# Patient Record
Sex: FEMALE | Race: WHITE | URBAN - METROPOLITAN AREA
[De-identification: names, ages, dates, MRNs, and addresses within clinical notes are randomized per-mention and may not be internally consistent; named-entity substitution may affect disease eponyms.]

---

## 2017-07-22 ENCOUNTER — INPATIENT (INPATIENT)
Facility: HOSPITAL | Age: 22
LOS: 4 days | Discharge: HOME | End: 2017-07-27
Attending: PSYCHIATRY & NEUROLOGY | Admitting: FAMILY MEDICINE
Payer: MEDICAID

## 2017-07-22 VITALS
TEMPERATURE: 98 F | WEIGHT: 179.9 LBS | DIASTOLIC BLOOD PRESSURE: 73 MMHG | RESPIRATION RATE: 16 BRPM | SYSTOLIC BLOOD PRESSURE: 151 MMHG | HEART RATE: 96 BPM | OXYGEN SATURATION: 96 %

## 2017-07-22 DIAGNOSIS — F39 UNSPECIFIED MOOD [AFFECTIVE] DISORDER: ICD-10-CM

## 2017-07-22 DIAGNOSIS — T14.91 SUICIDE ATTEMPT: ICD-10-CM

## 2017-07-22 DIAGNOSIS — F19.929 OTHER PSYCHOACTIVE SUBSTANCE USE, UNSPECIFIED WITH INTOXICATION, UNSPECIFIED: ICD-10-CM

## 2017-07-22 LAB
AMPHET UR-MCNC: NEGATIVE — SIGNIFICANT CHANGE UP
ANION GAP SERPL CALC-SCNC: 9 MMOL/L — SIGNIFICANT CHANGE UP (ref 5–17)
APAP SERPL-MCNC: < 2 UG/ML (ref 10–30)
APPEARANCE UR: CLEAR — SIGNIFICANT CHANGE UP
BACTERIA # UR AUTO: (no result)
BARBITURATES UR SCN-MCNC: NEGATIVE — SIGNIFICANT CHANGE UP
BASOPHILS # BLD AUTO: 0.2 K/UL — SIGNIFICANT CHANGE UP (ref 0–0.2)
BASOPHILS NFR BLD AUTO: 1.2 % — SIGNIFICANT CHANGE UP (ref 0–2)
BENZODIAZ UR-MCNC: NEGATIVE — SIGNIFICANT CHANGE UP
BILIRUB UR-MCNC: NEGATIVE — SIGNIFICANT CHANGE UP
BUN SERPL-MCNC: 12 MG/DL — SIGNIFICANT CHANGE UP (ref 7–23)
CALCIUM SERPL-MCNC: 9.4 MG/DL — SIGNIFICANT CHANGE UP (ref 8.5–10.1)
CHLORIDE SERPL-SCNC: 107 MMOL/L — SIGNIFICANT CHANGE UP (ref 96–108)
CO2 SERPL-SCNC: 24 MMOL/L — SIGNIFICANT CHANGE UP (ref 22–31)
COCAINE METAB.OTHER UR-MCNC: POSITIVE — SIGNIFICANT CHANGE UP
COLOR SPEC: YELLOW — SIGNIFICANT CHANGE UP
CREAT SERPL-MCNC: 0.94 MG/DL — SIGNIFICANT CHANGE UP (ref 0.5–1.3)
DIFF PNL FLD: NEGATIVE — SIGNIFICANT CHANGE UP
EOSINOPHIL # BLD AUTO: 0.1 K/UL — SIGNIFICANT CHANGE UP (ref 0–0.5)
EOSINOPHIL NFR BLD AUTO: 0.9 % — SIGNIFICANT CHANGE UP (ref 0–6)
EPI CELLS # UR: (no result)
ETHANOL SERPL-MCNC: <10 MG/DL — SIGNIFICANT CHANGE UP (ref 0–10)
GLUCOSE SERPL-MCNC: 108 MG/DL — HIGH (ref 70–99)
GLUCOSE UR QL: NEGATIVE MG/DL — SIGNIFICANT CHANGE UP
HCG SERPL-ACNC: <1 MIU/ML — SIGNIFICANT CHANGE UP
HCT VFR BLD CALC: 39.4 % — SIGNIFICANT CHANGE UP (ref 34.5–45)
HGB BLD-MCNC: 13.2 G/DL — SIGNIFICANT CHANGE UP (ref 11.5–15.5)
KETONES UR-MCNC: NEGATIVE — SIGNIFICANT CHANGE UP
LEUKOCYTE ESTERASE UR-ACNC: (no result)
LYMPHOCYTES # BLD AUTO: 14.5 % — SIGNIFICANT CHANGE UP (ref 13–44)
LYMPHOCYTES # BLD AUTO: 2.5 K/UL — SIGNIFICANT CHANGE UP (ref 1–3.3)
MCHC RBC-ENTMCNC: 27 PG — SIGNIFICANT CHANGE UP (ref 27–34)
MCHC RBC-ENTMCNC: 33.5 GM/DL — SIGNIFICANT CHANGE UP (ref 32–36)
MCV RBC AUTO: 80.4 FL — SIGNIFICANT CHANGE UP (ref 80–100)
METHADONE UR-MCNC: NEGATIVE — SIGNIFICANT CHANGE UP
MONOCYTES # BLD AUTO: 0.5 K/UL — SIGNIFICANT CHANGE UP (ref 0–0.9)
MONOCYTES NFR BLD AUTO: 3.3 % — SIGNIFICANT CHANGE UP (ref 2–14)
NEUTROPHILS # BLD AUTO: 13.5 K/UL — HIGH (ref 1.8–7.4)
NEUTROPHILS NFR BLD AUTO: 80.2 % — HIGH (ref 43–77)
NITRITE UR-MCNC: NEGATIVE — SIGNIFICANT CHANGE UP
OPIATES UR-MCNC: NEGATIVE — SIGNIFICANT CHANGE UP
PCP SPEC-MCNC: SIGNIFICANT CHANGE UP
PCP UR-MCNC: NEGATIVE — SIGNIFICANT CHANGE UP
PH UR: 6 — SIGNIFICANT CHANGE UP (ref 5–8)
PLATELET # BLD AUTO: 519 K/UL — HIGH (ref 150–400)
POTASSIUM SERPL-MCNC: 3.8 MMOL/L — SIGNIFICANT CHANGE UP (ref 3.5–5.3)
POTASSIUM SERPL-SCNC: 3.8 MMOL/L — SIGNIFICANT CHANGE UP (ref 3.5–5.3)
PROT UR-MCNC: 30 MG/DL
RBC # BLD: 4.89 M/UL — SIGNIFICANT CHANGE UP (ref 3.8–5.2)
RBC # FLD: 13.8 % — SIGNIFICANT CHANGE UP (ref 10.3–14.5)
RBC CASTS # UR COMP ASSIST: (no result) /HPF (ref 0–4)
SALICYLATES SERPL-MCNC: 2.5 MG/DL — LOW (ref 2.8–20)
SODIUM SERPL-SCNC: 140 MMOL/L — SIGNIFICANT CHANGE UP (ref 135–145)
SP GR SPEC: 1.02 — SIGNIFICANT CHANGE UP (ref 1.01–1.02)
THC UR QL: POSITIVE — SIGNIFICANT CHANGE UP
UROBILINOGEN FLD QL: NEGATIVE MG/DL — SIGNIFICANT CHANGE UP
WBC # BLD: 16.9 K/UL — HIGH (ref 3.8–10.5)
WBC # FLD AUTO: 16.9 K/UL — HIGH (ref 3.8–10.5)
WBC UR QL: SIGNIFICANT CHANGE UP

## 2017-07-22 PROCEDURE — 99285 EMERGENCY DEPT VISIT HI MDM: CPT | Mod: 25

## 2017-07-22 PROCEDURE — 93010 ELECTROCARDIOGRAM REPORT: CPT

## 2017-07-22 RX ORDER — QUETIAPINE FUMARATE 200 MG/1
25 TABLET, FILM COATED ORAL
Qty: 0 | Refills: 0 | Status: DISCONTINUED | OUTPATIENT
Start: 2017-07-22 | End: 2017-07-27

## 2017-07-22 RX ORDER — DIPHENHYDRAMINE HCL 50 MG
50 CAPSULE ORAL EVERY 6 HOURS
Qty: 0 | Refills: 0 | Status: DISCONTINUED | OUTPATIENT
Start: 2017-07-22 | End: 2017-07-27

## 2017-07-22 RX ORDER — SODIUM CHLORIDE 9 MG/ML
3 INJECTION INTRAMUSCULAR; INTRAVENOUS; SUBCUTANEOUS ONCE
Qty: 0 | Refills: 0 | Status: COMPLETED | OUTPATIENT
Start: 2017-07-22 | End: 2017-07-22

## 2017-07-22 RX ORDER — INFLUENZA VIRUS VACCINE 15; 15; 15; 15 UG/.5ML; UG/.5ML; UG/.5ML; UG/.5ML
0.5 SUSPENSION INTRAMUSCULAR ONCE
Qty: 0 | Refills: 0 | Status: DISCONTINUED | OUTPATIENT
Start: 2017-07-22 | End: 2017-07-22

## 2017-07-22 RX ADMIN — Medication 2 MILLIGRAM(S): at 05:00

## 2017-07-22 RX ADMIN — SODIUM CHLORIDE 3 MILLILITER(S): 9 INJECTION INTRAMUSCULAR; INTRAVENOUS; SUBCUTANEOUS at 02:36

## 2017-07-22 RX ADMIN — QUETIAPINE FUMARATE 25 MILLIGRAM(S): 200 TABLET, FILM COATED ORAL at 21:43

## 2017-07-22 RX ADMIN — Medication 2 MILLIGRAM(S): at 01:50

## 2017-07-22 RX ADMIN — Medication 2 MILLIGRAM(S): at 02:00

## 2017-07-22 RX ADMIN — Medication 2 MILLIGRAM(S): at 02:17

## 2017-07-22 NOTE — ED ADULT NURSE REASSESSMENT NOTE - NS ED NURSE REASSESS COMMENT FT1
Cont to have physical restraint as per MD order. 2 point restraints (right wrist, left ankle) in place. 1:1 at bedside. pt calm and sleeping at this time. Vitals stable. Warm blanket provided for comfort. Will cont to monitor for safety and comfort.
increased aggressive behaviors, yelling, screaming, verbally abusive, spitting at staff member stating "get out of my face." Security called and at bedside for safety. Dr. Calero made aware. 2mg IM ativan given per md order. 22g IV placed on Right upper arm. Pt tolerated well. Wrapped with kerlix. 1:1 at bedside. will cont to monitor for safety and comfort.
pt maintains to have aggressive behaviors to staff, screaming, shaking bed side to side, yelling and verbally abusive. Security at bedside. Dr. Calero made aware and at bedside. 2mg ativan given on Right arm at this time. 4 point leather restraint still in place. 1:1 at bedside. Will cont to monitor for safety and comfort.
pt ripped IV out with her mouth. 1:1 at bedside. Placed on bedpan and obtained urine sample. pt verbally abusive. 4 point leather restraint in place. Circulation checked. No signs of injury noted. pt undressed. Will cont to monitor for safety and comfort.
pt screaming "get me out of here. I don't want to die." aggressive behaviors, verbal abuse, trying to get out of the bed, screaming, yelling, harm to staff. Security at bedside. 4 point restrains (leather) in place by Security. 2mg IV IM given at this time. Dr. Calero at bedside. Will cont to monitor for safety and comfort.
pt's belongings locked with security. Pt refused to take necklace off. Pt states "My 3 y/o son got it for me for mother's day and I never took it off since." 4 point restraint in place. 1:1 at bedside. Will cont to monitor for safety and comfort.
security at bedside. Pt maintains aggressive behaviors, screaming, yelling, shaking bed side to side to get out. Dr. Calero made aware. 2mg Ativan given on Left thigh per md order. Will cont to monitor for safety and comfort.
1:2 in place for safety. restraints in place as ordered. pt asleep. no needs
2-Point leather restraints removed at this time. Pt VSS on monitor.
Pt awake, A&O x4, offering no complaints at this time, behavior in control. 1:1 maintained for safety.
Pt's mother Steph can be reached at [phone] and would like to be contacted when psych arrives. Pt mother updated on POC, all questions answered.
1:1 in place for safety, VSS on monitor.

## 2017-07-22 NOTE — ED BEHAVIORAL HEALTH ASSESSMENT NOTE - SUICIDAL BEHAVIOR DETAILS
OD large amt MDMA, SI with plan 3 days ago, impulsive OD 2 1/2 mo ago resulting in ICU admission, SA 1 yr ago walking on train tracks

## 2017-07-22 NOTE — ED ADULT NURSE NOTE - ED STAT RN HANDOFF DETAILS
Rita Del Real to Nataly CASTRO Received pt from Gisele RN, orders reviewed, 1:1 in place for safety, VSS on monitor.

## 2017-07-22 NOTE — ED BEHAVIORAL HEALTH ASSESSMENT NOTE - RISK ASSESSMENT
Pt is high risk of self harm in context of past and impulsive SA, sexual promiscuity and prostitution, excessive use of drugs and ongoing suicidal behavior. Pt has however recently contacted [facility] and showing interest in addressing addiction. Pt is high risk of self harm in context of past and impulsive SA, sexual promiscuity and prostitution, excessive use of drugs and ongoing suicidal behavior. Pt has however recently contacted [facility] and showing interest in addressing addiction.

## 2017-07-22 NOTE — ED BEHAVIORAL HEALTH ASSESSMENT NOTE - SUMMARY
Pt is a 23 yoswf homeless x 6 mo on SSI for Schizoaffective Disorder/Bipolar type on AOT and maintained by ACT team for weekly visits, with cocaine and MDMA dependence bib SCP after an intentional OD of large amt of MDMA.  Pt currently denying SI or need for psych admission however has poor insight and judgement in regard to self care, and last psych admission did not believe she needed admission after SA.  Pt is seeking drug treatment but due to her impaired functioning and self care, high risk behavior, impaired insight and poor judgement, has diminished ability to make safe decisions.  Discussed with Dr Eng and are recommending inpt psych admission, as Pt is a potential danger to self, due to impulsivity and high risk behavior, followed by rehab.  Will discuss further when fully awake and explore 939 admission with MD.  Mother supportive of psych admission Pt is a 23 yoswf homeless x 6 mo on SSI for Schizoaffective Disorder/Bipolar type on AOT and maintained by ACT team for weekly visits, with cocaine and MDMA dependence bib SCP after an intentional OD of large amt of MDMA.  Pt currently denying SI or need for psych admission however has poor insight and judgement in regard to self care, and last psych admission did not believe she needed admission after SA.  Pt is seeking drug treatment but due to her impaired functioning and self care, high risk behavior, impaired insight and poor judgement, has diminished ability to make safe decisions.  Discussed with Dr Eng and are recommending inpt psych admission, as Pt is a potential danger to self, due to impulsivity and high risk behavior, followed by rehab.  Will  be admitted to  on 939 status to Dr Eng.  Mother supportive of psych admission

## 2017-07-22 NOTE — ED PROVIDER NOTE - OBJECTIVE STATEMENT
21 y/o F presents to ED c/o overdose and suicidal thoughts. Police found her outside home address after neighbors called complaining. Police states they found her screaming, and yelling on PCP, cocaine and roofies. Pt is very agitated with the staff and acting like she is seeing things. Pt had to be physically and medically restrained. Mother called and stated she has multiple personality disorder and schizophrenia. HPI/ROS is limited due to patient's condition.

## 2017-07-22 NOTE — ED BEHAVIORAL HEALTH NOTE - BEHAVIORAL HEALTH NOTE
Psych eval requested for this 23 yo white female, undomiciled, mother of 2 children ages 2 1/2 and 6mo, in, custody of Pt mother, Steph,  who is primary historian at this time, as Pt has been sedated with Ativan 10 mg in intervals, since admission to ED, lost custody due to a pos tox  for cocaine when youngest born, no longer able to live with mother, PPH Schizoaffective Disorder Bipolar type, polysubstance abuse, cocaine dependence (ecstasy, LSD)    learning disability,  40-50 past psych admissions, h/o cutting, one recent SA last year when pregnant tried to walk on train tracks, hospitalized at [place] x 1 months, OD LSD  2/12 mo ago, found unresponsive in front of hospital, on vent in ICU at [facility]placed by [facility], 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  Pt is a Pt of Dr Leti Licea  x 1425 monday and fri with [facility]/ ACT team.  Pt AOT, received  Abilify 400 IM [id number], last on 7/7/17, confirmed with Brody on call staff with ACT team .  Mother reports Pt father Schizophrenic.  Pt developed symptoms age 9, AH/VH reported by school.  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  Pt has 2 brothers with Autism and suspects Pt has same, never diagnosed.    Pt reported to mother she did not want to live anymore, wanted to go to "heaven" and to jump in front of a train.  Pt attempted contact with [facility] recently in attempt to get clean from drugs.    Will follow with fully eval when awake.

## 2017-07-22 NOTE — ED BEHAVIORAL HEALTH ASSESSMENT NOTE - ACTIVATING EVENTS/STRESSORS
Non-compliant with treatment/Recent losses/Pending incarceration or homelessness/Current or pending isolation

## 2017-07-22 NOTE — ED BEHAVIORAL HEALTH ASSESSMENT NOTE - OTHER PAST PSYCHIATRIC HISTORY (INCLUDE DETAILS REGARDING ONSET, COURSE OF ILLNESS, INPATIENT/OUTPATIENT TREATMENT)
approx 40 past psych admissions since 9, most recent [place] approx 1 yr ago s/p walking on train tracks.  Overdose 2 1/2 mo ago [facility]  Attempted strangulation with belt 3-4 mo ago, self aborted  h/o cutting since teens.  SI with plan 3 days ago.  OD last night large amts of cocaine and jose.

## 2017-07-22 NOTE — ED BEHAVIORAL HEALTH ASSESSMENT NOTE - DESCRIPTION
Pt is calmer lethargic, sedated, denies active SIIP, HIIP poor insight and judgment with impulsive  and high risk behavior.  Denies AH VH currently, last AH approx 1 yr ago, denies paranoid or delusions.  Pt oriented but sedated, gave birth to 2 children single, homeless, mother of 2 children in custody of mother, 11th grade education dropped out spec ed, SSI

## 2017-07-22 NOTE — ED PROVIDER NOTE - CARE PLAN
Principal Discharge DX:	Suicidal behavior with attempted self-injury  Secondary Diagnosis:	Drug intoxication, with unspecified complication  Secondary Diagnosis:	Mood disorder

## 2017-07-22 NOTE — ED BEHAVIORAL HEALTH ASSESSMENT NOTE - SUICIDE RISK FACTORS
Access to means (pills, firearms, etc.)/Substance abuse/dependence/Agitation/severe anxiety/Highly impulsive behavior

## 2017-07-22 NOTE — ED ADULT TRIAGE NOTE - CHIEF COMPLAINT QUOTE
As per ems patient had intentional overdose on jose and cocaine. Patient arrived unresponsive to sternal rub, once placed in stretcher patient started screaming at ED staff. Unable to give any information.

## 2017-07-22 NOTE — ED BEHAVIORAL HEALTH ASSESSMENT NOTE - CURRENT MEDICATION
Ability 400 mg q 4 weeks last dose 7/7/17.  Dr Leti Licea  ex 1425 Monday and Fri with ACT team [facility]   Pt is AOT

## 2017-07-22 NOTE — ED BEHAVIORAL HEALTH ASSESSMENT NOTE - HPI (INCLUDE ILLNESS QUALITY, SEVERITY, DURATION, TIMING, CONTEXT, MODIFYING FACTORS, ASSOCIATED SIGNS AND SYMPTOMS)
23 yoswb, undomiciled since she lost custody of her 2 children ages 6 mo and 2 1/2 yrs when baby tested pos for cocaine.  Since Pt unable to live with mother and has been staying with various people and has been engaging in high risk behavior and prostitution.     Pt  presents to ED c/o overdose and suicidal thoughts. Police found her outside home address after neighbors called complaining. Police states they found her screaming, and yelling on PCP, cocaine and roofies. Pt is very agitated with the staff and acting like she is seeing things. Pt had to be physically and medically restrained. Mother called and stated she has multiple personality disorder and schizophrenia.    PPH Schizoaffective Disorder Bipolar type, polysubstance abuse, cocaine dependence (ecstasy, LSD)    learning disability,  40-50 past psych admissions, h/o cutting, one recent SA last year when pregnant tried to walk on train tracks, hospitalized at [place] x 1 months, OD LSD  2/12 mo ago, found unresponsive in front of hospital, on vent in ICU at [facility],    Pt is a Pt of Dr Leti Licea  x 1425 monday and fri with [facility]/ ACT team.  Pt AOT, received  Abilify 400 IM [id number], last on 7/7/17, confirmed with Brody on call staff with ACT team .  Mother reports Pt father Schizophrenic.  Pt developed symptoms age 9, AH/VH reported by school.  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  Pt has 2 brothers with Autism and suspects Pt has same, never diagnosed.    Pt reevaluated 1500 when awake but lethargic s/p multiple doses of Ativan overnight for agitation.  Pt denies SI currently but admits to SI 3 days ago with thoughts of jumping out of a window in context of exbf not giving her $5 for food.  Pt reports she did not do it due to her children.  Pt admits to past SA approx 1 yr ago (interrupted,  details unknown) when was found on railroad tracking trying to get killed by train, while pregnant with 2 yr old at home, and under influence of drugs and was subsequently hospitalized at [place] for 1 month.  Pt reports recent SA by choking approx 3-4 months ago, place belt around neck and choked herself but aborted attempt.     Pt reports daily cocaine and jose use and admitted to taking "a lot" of jose, quantified as 2-3 gram, plus 1/16 of 1 oz of cocaine approx 3 .3 Gm with friends, last night and reported to ED as SA, but states she does not remember.  Mother states, Pt reported to mother she did not want to live anymore, wanted to go to "heaven" and to jump in front of a train.  Mother speaks to her twice a day and is open about her drug use.  She confided in mother about SI and mother is worried, however hopeful when Pt attempted to contact with [facility] recently in attempt to get clean from drugs.

## 2017-07-22 NOTE — ED BEHAVIORAL HEALTH ASSESSMENT NOTE - DETAILS
Pt lost custody when baby born cocaine pos h/o cutting teenager, h/o severe OD resulting vent in ICU  2 1/2 mo ago unclear if intentional SA Allergic Haldol rash and CPZ, rash??, Geodon "psychosis" father Schizophrenic tox pos cocaine with birth of youngest child, lost custody Dr Eng Dr Silva

## 2017-07-22 NOTE — ED ADULT NURSE NOTE - OBJECTIVE STATEMENT
21 y/o female confused, combative and aggressive behaviors BIBA overdose. Pt brought in by police after neighbors called complaining. pt was yelling, screaming outside. pt uncooperative with treatment. Yelling, screaming, spitting, aggressive behaviors to staff members noted. Physically and medically restrained upon arrival. History limited to pt's condition.

## 2017-07-23 DIAGNOSIS — Z98.891 HISTORY OF UTERINE SCAR FROM PREVIOUS SURGERY: Chronic | ICD-10-CM

## 2017-07-23 DIAGNOSIS — Z90.49 ACQUIRED ABSENCE OF OTHER SPECIFIED PARTS OF DIGESTIVE TRACT: Chronic | ICD-10-CM

## 2017-07-23 DIAGNOSIS — R21 RASH AND OTHER NONSPECIFIC SKIN ERUPTION: ICD-10-CM

## 2017-07-23 DIAGNOSIS — F31.9 BIPOLAR DISORDER, UNSPECIFIED: ICD-10-CM

## 2017-07-23 DIAGNOSIS — R05 COUGH: ICD-10-CM

## 2017-07-23 PROCEDURE — 71010: CPT | Mod: 26

## 2017-07-23 RX ORDER — CALAMINE AND ZINC OXIDE AND PHENOL 160; 10 MG/ML; MG/ML
1 LOTION TOPICAL
Qty: 0 | Refills: 0 | Status: DISCONTINUED | OUTPATIENT
Start: 2017-07-23 | End: 2017-07-27

## 2017-07-23 RX ORDER — TIOTROPIUM BROMIDE 18 UG/1
1 CAPSULE ORAL; RESPIRATORY (INHALATION) DAILY
Qty: 0 | Refills: 0 | Status: DISCONTINUED | OUTPATIENT
Start: 2017-07-23 | End: 2017-07-27

## 2017-07-23 RX ORDER — ALBUTEROL 90 UG/1
2 AEROSOL, METERED ORAL EVERY 6 HOURS
Qty: 0 | Refills: 0 | Status: DISCONTINUED | OUTPATIENT
Start: 2017-07-23 | End: 2017-07-27

## 2017-07-23 RX ORDER — HYDROCORTISONE 1 %
1 OINTMENT (GRAM) TOPICAL THREE TIMES A DAY
Qty: 0 | Refills: 0 | Status: DISCONTINUED | OUTPATIENT
Start: 2017-07-23 | End: 2017-07-24

## 2017-07-23 RX ORDER — NICOTINE POLACRILEX 2 MG
1 GUM BUCCAL DAILY
Qty: 0 | Refills: 0 | Status: DISCONTINUED | OUTPATIENT
Start: 2017-07-23 | End: 2017-07-27

## 2017-07-23 RX ADMIN — CALAMINE AND ZINC OXIDE AND PHENOL 1 APPLICATION(S): 160; 10 LOTION TOPICAL at 16:41

## 2017-07-23 RX ADMIN — ALBUTEROL 2 PUFF(S): 90 AEROSOL, METERED ORAL at 19:34

## 2017-07-23 NOTE — H&P ADULT - NSHPSOCIALHISTORY_GEN_ALL_CORE
Homeless, Lost custody of her 2 kids ( 2 and 1/2 and 6 month old  Current smoker, multisubstance abuse

## 2017-07-23 NOTE — H&P ADULT - NSHPPHYSICALEXAM_GEN_ALL_CORE
Vital Signs Last 24 Hrs  HR: 76 (22 Jul 2017 17:00) (73 - 76)  BP: 112/53 (22 Jul 2017 17:00) (112/53 - 112/65)  BP(mean): --  RR: 16 (22 Jul 2017 17:00) (16 - 17)  SpO2: 96% (22 Jul 2017 17:00) (96% - 99%)    PHYSICAL EXAM:  General: Well developed; well nourished; in no acute distress  Eyes: PERRLA, EOMI; conjunctiva and sclera clear  Head: Normocephalic; atraumatic  ENMT: No nasal discharge; airway clear  Neck: Supple; non tender; no masses  Respiratory: Decreased Breath sounds, No wheezes, rales or rhonchi  Cardiovascular: Regular rate and rhythm. S1 and S2 Normal; No murmurs  Gastrointestinal: Soft non-tender non-distended; Normal bowel sounds  Genitourinary: No suprapubic tenderness   Extremities: Normal range of motion, No  edema  Vascular: Peripheral pulses palpable 2+ bilaterally  Neurological: Alert and oriented x3, non focal   Skin: Warm and dry.  R dorsal foot 8x4cm confluenting blisters with mildly erythematous  base, tender to touch, not warm, no drainage, similar but smaller and no erythema on l dorsal foot, small multiple singular blisters b/l LE with healed excoriations and scratch marks    Lymph Nodes: No acute cervical adenopathy  Musculoskeletal: Normal muscle tone, no joint effusion or edema   Psychiatric: Cooperative and appropriate

## 2017-07-23 NOTE — H&P ADULT - ASSESSMENT
22 y.o female with PMH of Bipolar Depression and Schizoaffective Disorder admitted to Psychiatric floor for Suicidal Thoughts and risk behaviour with multiple SA in the past.

## 2017-07-23 NOTE — H&P ADULT - PROBLEM SELECTOR PLAN 2
with greenish phlegm and elevated WBCs on admission, likely due to Acute Bronchitis with Bronchospasm.  Current smoker  Check CXR to r/o PNA. Pt had admission to IcU and intubated 2-3 months ago  Repeat CBC  Start Levaquin  PO  Cough med  Start Spiriva and Albuterol   Monitor response

## 2017-07-23 NOTE — PROGRESS NOTE ADULT - SUBJECTIVE AND OBJECTIVE BOX
Pt was seen and examined at bedside with Night RN for eval. of rash on b/l LE.  Pt was seen during daytime by hospitalist for H&P and noted to have b/l LE Poison Ivy rash? and Calamine lotion was prescribed. As per Night RN, pt is c/o burning after applying calamine lotion and c/o itching. on exam, pt has linear streaked vesicles on b/l LE and eczema on foot.  Will order topical steroid now. D/w Night RN and Pt, if rash gets worse call hospitalist again to re evaluate.

## 2017-07-23 NOTE — H&P ADULT - HISTORY OF PRESENT ILLNESS
23 y/o F brought to ED by Police  agitated  with overdose and suicidal thoughts. Police found her outside home address after neighbors called complaining that Pt was agitated and acting out.   Police states they found her screaming, and yelling on PCP, cocaine and roofies. In ED she was  very agitated with the staff and acting like she is seeing things. Pt had to be physically and medically restrained.  Her Mother was  called and she  stated she has multiple personality disorder and schizophrenia. She  had multiple psych admissions in the past and multiple SA  starting age 9 (40-50 admissions)  last about 2-3 months ago she was admitted to ICU at [facility] and required intubation. Pt was evaluated by psychiatry and admitted to .   Today Pt id osbaldo and comfortable in bed agreed on conversation and   exam. C/o having cough with greenish phlegm last 3 days, no fevers, no SOB, but radha intermittent wheezing. Denies/o Asthma. Also reports having rash on her feet  and lower legs, itchy and tender to touch,  not sure how developed, no h/o eczema. Pt denies Dysuria or constipation, no HA, no CP

## 2017-07-24 DIAGNOSIS — F14.20 COCAINE DEPENDENCE, UNCOMPLICATED: ICD-10-CM

## 2017-07-24 DIAGNOSIS — Z91.19 PATIENT'S NONCOMPLIANCE WITH OTHER MEDICAL TREATMENT AND REGIMEN: ICD-10-CM

## 2017-07-24 DIAGNOSIS — F16.929 HALLUCINOGEN USE, UNSPECIFIED WITH INTOXICATION, UNSPECIFIED: ICD-10-CM

## 2017-07-24 DIAGNOSIS — F25.0 SCHIZOAFFECTIVE DISORDER, BIPOLAR TYPE: ICD-10-CM

## 2017-07-24 RX ORDER — ACETAMINOPHEN 500 MG
650 TABLET ORAL EVERY 6 HOURS
Qty: 0 | Refills: 0 | Status: DISCONTINUED | OUTPATIENT
Start: 2017-07-24 | End: 2017-07-27

## 2017-07-24 RX ORDER — DOCUSATE SODIUM 100 MG
100 CAPSULE ORAL DAILY
Qty: 0 | Refills: 0 | Status: DISCONTINUED | OUTPATIENT
Start: 2017-07-24 | End: 2017-07-27

## 2017-07-24 RX ORDER — MAGNESIUM HYDROXIDE 400 MG/1
30 TABLET, CHEWABLE ORAL DAILY
Qty: 0 | Refills: 0 | Status: DISCONTINUED | OUTPATIENT
Start: 2017-07-24 | End: 2017-07-27

## 2017-07-24 RX ADMIN — Medication 1 MILLIGRAM(S): at 17:33

## 2017-07-24 RX ADMIN — Medication 1 MILLIGRAM(S): at 20:27

## 2017-07-24 RX ADMIN — Medication 1 TABLET(S): at 13:04

## 2017-07-24 RX ADMIN — ALBUTEROL 2 PUFF(S): 90 AEROSOL, METERED ORAL at 21:11

## 2017-07-24 RX ADMIN — CALAMINE AND ZINC OXIDE AND PHENOL 1 APPLICATION(S): 160; 10 LOTION TOPICAL at 20:29

## 2017-07-24 RX ADMIN — Medication 1 APPLICATION(S): at 12:51

## 2017-07-24 RX ADMIN — Medication 1 MILLIGRAM(S): at 12:48

## 2017-07-24 RX ADMIN — ALBUTEROL 2 PUFF(S): 90 AEROSOL, METERED ORAL at 08:42

## 2017-07-24 RX ADMIN — ALBUTEROL 2 PUFF(S): 90 AEROSOL, METERED ORAL at 14:07

## 2017-07-24 RX ADMIN — Medication 1 APPLICATION(S): at 20:27

## 2017-07-24 RX ADMIN — Medication 100 MILLIGRAM(S): at 02:18

## 2017-07-24 RX ADMIN — Medication 1 APPLICATION(S): at 08:49

## 2017-07-24 RX ADMIN — Medication 1 PATCH: at 08:49

## 2017-07-24 RX ADMIN — QUETIAPINE FUMARATE 25 MILLIGRAM(S): 200 TABLET, FILM COATED ORAL at 02:18

## 2017-07-24 NOTE — PROGRESS NOTE ADULT - SUBJECTIVE AND OBJECTIVE BOX
CC: was brought for agitation/ cough (23 Jul 2017 11:02)    HPI:  21 y/o F brought to ED by Police  agitated  with overdose and suicidal thoughts. Police found her outside home address after neighbors called complaining that Pt was agitated and acting out.   Police states they found her screaming, and yelling on PCP, cocaine and roofies. In ED she was  very agitated with the staff and acting like she is seeing things. Pt had to be physically and medically restrained.  Her Mother was  called and she  stated she has multiple personality disorder and schizophrenia. She  had multiple psych admissions in the past and multiple SA  starting age 9 (40-50 admissions)  last about 2-3 months ago she was admitted to ICU at [facility] and required intubation. Pt was evaluated by psychiatry and admitted to .   Today Pt id osbaldo and comfortable in bed agreed on conversation and   exam. C/o having cough with greenish phlegm last 3 days, no fevers, no SOB, but radha intermittent wheezing. Denies/o Asthma. Also reports having rash on her feet  and lower legs, itchy and tender to touch,  not sure how developed, no h/o eczema. Pt denies Dysuria or constipation, no HA, no CP (23 Jul 2017 11:02)    INTERVAL HPI/ OVERNIGHT EVENTS: Pt was seen and examined, sleeping comfortably, calm on awakening, reports that still coughing, No wheezing, no fevers or SOB. Rash  still itching.      Vital Signs Last 24 Hrs  T(C): 36.5 (24 Jul 2017 08:47), Max: 36.5 (24 Jul 2017 08:47)  T(F): 97.7 (24 Jul 2017 08:47), Max: 97.7 (24 Jul 2017 08:47)  HR: 125 (24 Jul 2017 08:47) (125 - 125)  BP: 129/87 (24 Jul 2017 08:47) (129/87 - 129/87)  BP(mean): --  RR: 14 (24 Jul 2017 08:47) (14 - 14)  SpO2: 100% (24 Jul 2017 08:47) (100% - 100%)    All other review of systems negative, except as noted in HPI	      PHYSICAL EXAM:  General: Well developed; well nourished; in no acute distress  Eyes: PERRLA, EOMI; conjunctiva and sclera clear  Head: Normocephalic; atraumatic  ENMT: No nasal discharge; airway clear  Neck: Supple; non tender; no masses  Respiratory: Decreased Breath sounds, No wheezes, rales or rhonchi  Cardiovascular: Regular rate and rhythm. S1 and S2 Normal; No murmurs  Gastrointestinal: Soft non-tender non-distended; Normal bowel sounds  Genitourinary: No suprapubic tenderness   Extremities: Normal range of motion, No  edema  Vascular: Peripheral pulses palpable 2+ bilaterally  Neurological: Alert and oriented x3, non focal   Skin: Warm and dry.  R dorsal foot rash looks less  erythematous and edematous  , tender to touch, not warm, no drainage, similar but smaller and no erythema on l dorsal foot, small multiple singular blisters b/l LE with healed excoriations and scratch marks    Lymph Nodes: No acute cervical adenopathy  Musculoskeletal: Normal muscle tone, no joint effusion or edema   Psychiatric: Cooperative and appropriate      MEDICATIONS  (STANDING):  ALBUTerol    90 MICROgram(s) HFA Inhaler 2 Puff(s) Inhalation every 6 hours  tiotropium 18 MICROgram(s) Capsule 1 Capsule(s) Inhalation daily  nicotine -  14 mG/24Hr(s) Patch 1 patch Transdermal daily  levoFLOXacin  Tablet 500 milliGRAM(s) Oral every 24 hours  multivitamin 1 Tablet(s) Oral daily  LORazepam     Tablet 1 milliGRAM(s) Oral four times a day  clobetasol 0.05% Cream 1 Application(s) Topical two times a day    MEDICATIONS  (PRN):  diphenhydrAMINE   Injectable 50 milliGRAM(s) IntraMuscular every 6 hours PRN Agitation  LORazepam   Injectable 2 milliGRAM(s) IntraMuscular once PRN Agitation  QUEtiapine 25 milliGRAM(s) Oral four times a day PRN agitation  guaiFENesin    Syrup 100 milliGRAM(s) Oral every 6 hours PRN Cough  calamine Lotion 1 Application(s) Topical four times a day PRN Itching  acetaminophen   Tablet 650 milliGRAM(s) Oral every 6 hours PRN For Temp greater than 38 C (100.4 F)  docusate sodium 100 milliGRAM(s) Oral daily PRN Constipation  magnesium hydroxide Suspension 30 milliLiter(s) Oral daily PRN Constipation  aluminum hydroxide/magnesium hydroxide/simethicone Suspension 30 milliLiter(s) Oral every 6 hours PRN Dyspepsia        RADIOLOGY & ADDITIONAL TESTS:    EXAM:  CHEST SINGLE VIEW FRONTAL                        PROCEDURE DATE:  07/23/2017   FINDINGS/  IMPRESSION:        The lungs are clear.  No pleural abnormality is seen.    Cardiomediastinal silhouette is intact.        Assessment and Plan:   Assessment:  · Assessment		  22 y.o female with PMH of Bipolar Depression and Schizoaffective Disorder admitted to Psychiatric floor for Suicidal Thoughts and risk behaviour with multiple SA in the past.      Problem/Plan - 1:  Suicidal behavior with attempted self-injury.     management as per psych.     Problem/Plan - 2:  Cough with greenish phlegm and elevated WBCs on admission, likely due to Acute Bronchitis with Bronchospasm.  Current smoker  CXR  neg for  PNA.   Refuses blood draws  C/w  Levaquin  PO x 5 days   Cough med PRN   Start Spiriva and Albuterol   Monitor response.     Problem/Plan - 3:   Rash likely poison ivy. [place] was in the woods as per Rn   Did not tolerate calamine lotion  Start high dose topical steroids x 7 days   If no improvement will need derm eval       Thank you for consult,  recall  as needed

## 2017-07-24 NOTE — PROGRESS NOTE BEHAVIORAL HEALTH - OTHER
pt with dyed pink hair and small nasal piercing to left nares, butterfyl tattoos to both arms restless, anxious, only now slowly  comprehending how and why she was admitted reported pt h/o VH with hallucinogens ( LSD) and MDMA)

## 2017-07-25 LAB
ALBUMIN SERPL ELPH-MCNC: 3.6 G/DL — SIGNIFICANT CHANGE UP (ref 3.3–5)
ALP SERPL-CCNC: 68 U/L — SIGNIFICANT CHANGE UP (ref 40–120)
ALT FLD-CCNC: 26 U/L — SIGNIFICANT CHANGE UP (ref 12–78)
ANION GAP SERPL CALC-SCNC: 7 MMOL/L — SIGNIFICANT CHANGE UP (ref 5–17)
AST SERPL-CCNC: 12 U/L — LOW (ref 15–37)
BASOPHILS # BLD AUTO: 0.1 K/UL — SIGNIFICANT CHANGE UP (ref 0–0.2)
BASOPHILS NFR BLD AUTO: 1.3 % — SIGNIFICANT CHANGE UP (ref 0–2)
BILIRUB SERPL-MCNC: 0.2 MG/DL — SIGNIFICANT CHANGE UP (ref 0.2–1.2)
BUN SERPL-MCNC: 13 MG/DL — SIGNIFICANT CHANGE UP (ref 7–23)
CALCIUM SERPL-MCNC: 9.2 MG/DL — SIGNIFICANT CHANGE UP (ref 8.5–10.1)
CHLORIDE SERPL-SCNC: 104 MMOL/L — SIGNIFICANT CHANGE UP (ref 96–108)
CO2 SERPL-SCNC: 26 MMOL/L — SIGNIFICANT CHANGE UP (ref 22–31)
CREAT SERPL-MCNC: 0.75 MG/DL — SIGNIFICANT CHANGE UP (ref 0.5–1.3)
EOSINOPHIL # BLD AUTO: 0.4 K/UL — SIGNIFICANT CHANGE UP (ref 0–0.5)
EOSINOPHIL NFR BLD AUTO: 3.2 % — SIGNIFICANT CHANGE UP (ref 0–6)
ERYTHROCYTE [SEDIMENTATION RATE] IN BLOOD: 17 MM/HR — HIGH (ref 0–15)
GLUCOSE SERPL-MCNC: 98 MG/DL — SIGNIFICANT CHANGE UP (ref 70–99)
HCT VFR BLD CALC: 39.4 % — SIGNIFICANT CHANGE UP (ref 34.5–45)
HGB BLD-MCNC: 13 G/DL — SIGNIFICANT CHANGE UP (ref 11.5–15.5)
LYMPHOCYTES # BLD AUTO: 3.8 K/UL — HIGH (ref 1–3.3)
LYMPHOCYTES # BLD AUTO: 32.6 % — SIGNIFICANT CHANGE UP (ref 13–44)
MCHC RBC-ENTMCNC: 26.6 PG — LOW (ref 27–34)
MCHC RBC-ENTMCNC: 33 GM/DL — SIGNIFICANT CHANGE UP (ref 32–36)
MCV RBC AUTO: 80.9 FL — SIGNIFICANT CHANGE UP (ref 80–100)
MONOCYTES # BLD AUTO: 0.6 K/UL — SIGNIFICANT CHANGE UP (ref 0–0.9)
MONOCYTES NFR BLD AUTO: 5.3 % — SIGNIFICANT CHANGE UP (ref 2–14)
NEUTROPHILS # BLD AUTO: 6.7 K/UL — SIGNIFICANT CHANGE UP (ref 1.8–7.4)
NEUTROPHILS NFR BLD AUTO: 57.6 % — SIGNIFICANT CHANGE UP (ref 43–77)
PLATELET # BLD AUTO: 474 K/UL — HIGH (ref 150–400)
POTASSIUM SERPL-MCNC: 3.9 MMOL/L — SIGNIFICANT CHANGE UP (ref 3.5–5.3)
POTASSIUM SERPL-SCNC: 3.9 MMOL/L — SIGNIFICANT CHANGE UP (ref 3.5–5.3)
PROT SERPL-MCNC: 7.6 GM/DL — SIGNIFICANT CHANGE UP (ref 6–8.3)
RBC # BLD: 4.87 M/UL — SIGNIFICANT CHANGE UP (ref 3.8–5.2)
RBC # FLD: 13.6 % — SIGNIFICANT CHANGE UP (ref 10.3–14.5)
SODIUM SERPL-SCNC: 137 MMOL/L — SIGNIFICANT CHANGE UP (ref 135–145)
WBC # BLD: 11.6 K/UL — HIGH (ref 3.8–10.5)
WBC # FLD AUTO: 11.6 K/UL — HIGH (ref 3.8–10.5)

## 2017-07-25 PROCEDURE — 93010 ELECTROCARDIOGRAM REPORT: CPT

## 2017-07-25 RX ADMIN — Medication 1 PATCH: at 09:14

## 2017-07-25 RX ADMIN — ALBUTEROL 2 PUFF(S): 90 AEROSOL, METERED ORAL at 09:26

## 2017-07-25 RX ADMIN — Medication 1 MILLIGRAM(S): at 17:47

## 2017-07-25 RX ADMIN — Medication 1 MILLIGRAM(S): at 09:13

## 2017-07-25 RX ADMIN — Medication 1 MILLIGRAM(S): at 13:50

## 2017-07-25 RX ADMIN — ALBUTEROL 2 PUFF(S): 90 AEROSOL, METERED ORAL at 19:43

## 2017-07-25 RX ADMIN — Medication 1 APPLICATION(S): at 20:50

## 2017-07-25 RX ADMIN — QUETIAPINE FUMARATE 25 MILLIGRAM(S): 200 TABLET, FILM COATED ORAL at 20:35

## 2017-07-25 RX ADMIN — Medication 1 TABLET(S): at 09:12

## 2017-07-25 RX ADMIN — Medication 1 MILLIGRAM(S): at 20:35

## 2017-07-25 RX ADMIN — ALBUTEROL 2 PUFF(S): 90 AEROSOL, METERED ORAL at 14:34

## 2017-07-25 RX ADMIN — QUETIAPINE FUMARATE 25 MILLIGRAM(S): 200 TABLET, FILM COATED ORAL at 14:10

## 2017-07-25 RX ADMIN — QUETIAPINE FUMARATE 25 MILLIGRAM(S): 200 TABLET, FILM COATED ORAL at 11:29

## 2017-07-25 RX ADMIN — TIOTROPIUM BROMIDE 1 CAPSULE(S): 18 CAPSULE ORAL; RESPIRATORY (INHALATION) at 09:26

## 2017-07-25 RX ADMIN — Medication 100 MILLIGRAM(S): at 18:55

## 2017-07-25 RX ADMIN — Medication 650 MILLIGRAM(S): at 18:12

## 2017-07-25 NOTE — PROGRESS NOTE ADULT - SUBJECTIVE AND OBJECTIVE BOX
CC: was brought for agitation/ cough (23 Jul 2017 11:02)    HPI:  21 y/o F brought to ED by Police  agitated  with overdose and suicidal thoughts. Police found her outside home address after neighbors called complaining that Pt was agitated and acting out.   Police states they found her screaming, and yelling on PCP, cocaine and roofies. In ED she was  very agitated with the staff and acting like she is seeing things. Pt had to be physically and medically restrained.  Her Mother was  called and she  stated she has multiple personality disorder and schizophrenia. She  had multiple psych admissions in the past and multiple SA  starting age 9 (40-50 admissions)  last about 2-3 months ago she was admitted to ICU at [facility] and required intubation. Pt was evaluated by psychiatry and admitted to .   Today Pt id osbaldo and comfortable in bed agreed on conversation and   exam. C/o having cough with greenish phlegm last 3 days, no fevers, no SOB, but radha intermittent wheezing. Denies/o Asthma. Also reports having rash on her feet  and lower legs, itchy and tender to touch,  not sure how developed, no h/o eczema. Pt denies Dysuria or constipation, no HA, no CP (23 Jul 2017 11:02)    INTERVAL HPI/ OVERNIGHT EVENTS:  Was called by RN that Pt  c/o ear pain. Pt  was seen and examined,  reports that start having R ear lobe pain and HA this am and noticed that earring went inside the skin and causing pain. No fevers or chills. Cough is better.  Rash  improving.  Advised not to refused blood work       Vital Signs Last 24 Hrs  T(C): 36.6 (25 Jul 2017 08:21), Max: 36.6 (25 Jul 2017 08:21)  T(F): 97.9 (25 Jul 2017 08:21), Max: 97.9 (25 Jul 2017 08:21)  HR: 90 (25 Jul 2017 09:30) (65 - 90)  BP: 109/66 (25 Jul 2017 08:21) (109/66 - 109/66)  BP(mean): --  RR: 16 (25 Jul 2017 08:21) (16 - 16)  SpO2: 99% (25 Jul 2017 09:30) (98% - 100%)    All other review of systems negative, except as noted in HPI	      PHYSICAL EXAM:  General: Well developed; well nourished; in no acute distress  Eyes: PERRLA, EOMI; conjunctiva and sclera clear  Head: Normocephalic; atraumatic  ENMT: No nasal discharge; airway clear. R tragus with piercing, earing impacted into tragus with surrounding mild edema and faint erythema, tragus tender to manipulation, + purulent discharge  in external canal   Neck: Supple; non tender; no masses, no lymph nodes   Respiratory: Decreased Breath sounds, No wheezes, rales or rhonchi  Cardiovascular: Regular rate and rhythm. S1 and S2 Normal; No murmurs  Gastrointestinal: Soft non-tender non-distended; Normal bowel sounds  Genitourinary: No suprapubic tenderness   Extremities: Normal range of motion, No  edema  Vascular: Peripheral pulses palpable 2+ bilaterally  Neurological: Alert and oriented x3, non focal   Skin: Warm and dry.  R dorsal foot rash improving   Lymph Nodes: No acute cervical adenopathy  Musculoskeletal: Normal muscle tone, no joint effusion or edema   Psychiatric: Cooperative and appropriate      MEDICATIONS  (STANDING):  ALBUTerol    90 MICROgram(s) HFA Inhaler 2 Puff(s) Inhalation every 6 hours  tiotropium 18 MICROgram(s) Capsule 1 Capsule(s) Inhalation daily  nicotine -  14 mG/24Hr(s) Patch 1 patch Transdermal daily  levoFLOXacin  Tablet 500 milliGRAM(s) Oral every 24 hours  multivitamin 1 Tablet(s) Oral daily  LORazepam     Tablet 1 milliGRAM(s) Oral four times a day  clobetasol 0.05% Cream 1 Application(s) Topical two times a day    MEDICATIONS  (PRN):  diphenhydrAMINE   Injectable 50 milliGRAM(s) IntraMuscular every 6 hours PRN Agitation  LORazepam   Injectable 2 milliGRAM(s) IntraMuscular once PRN Agitation  QUEtiapine 25 milliGRAM(s) Oral four times a day PRN agitation  guaiFENesin    Syrup 100 milliGRAM(s) Oral every 6 hours PRN Cough  calamine Lotion 1 Application(s) Topical four times a day PRN Itching  acetaminophen   Tablet 650 milliGRAM(s) Oral every 6 hours PRN For Temp greater than 38 C (100.4 F)  docusate sodium 100 milliGRAM(s) Oral daily PRN Constipation  magnesium hydroxide Suspension 30 milliLiter(s) Oral daily PRN Constipation  aluminum hydroxide/magnesium hydroxide/simethicone Suspension 30 milliLiter(s) Oral every 6 hours PRN Dyspepsia            RADIOLOGY & ADDITIONAL TESTS:    EXAM:  CHEST SINGLE VIEW FRONTAL                        PROCEDURE DATE:  07/23/2017   FINDINGS/  IMPRESSION:        The lungs are clear.  No pleural abnormality is seen.    Cardiomediastinal silhouette is intact.        Assessment and Plan:   Assessment:  · Assessment		  22 y.o female with PMH of Bipolar Depression and Schizoaffective Disorder admitted to Psychiatric floor for Suicidal Thoughts and risk behaviour with multiple SA in the past.      Problem/Plan - 1:  Suicidal behavior with attempted self-injury.     management as per psych.     Problem/Plan - 2:  Cough with greenish phlegm and elevated WBCs on admission, likely due to Acute Bronchitis with Bronchospasm.  Current smoker  CXR  neg for  PNA.   Refused blood draws  C/w  Levaquin  PO x 5 days   Cough med PRN   Start Spiriva and Albuterol   Improving     Problem/Plan - 3:  LE  Rash likely poison ivy. Pt was in the woods as per Rn   Did not tolerate calamine lotion  Start high dose topical steroids x 7 days   If no improvement will need derm eval       Problem/Plan - 4:  R face/R auricle cellulitis   due to impacted foreign body  No fevers   Will need SX eval   C/w Levaquin, will add Doxy  Tylenol for pain   Labs stat   ID eval       Thank you for consult, will f/u

## 2017-07-25 NOTE — PROGRESS NOTE BEHAVIORAL HEALTH - OTHER
pt with dyed pink hair and small nasal piercing to left nares, butterfly  tattoos to both arms restless, anxious, only now slowly  comprehending how and why she was admitted reported pt h/o VH with hallucinogens ( LSD) and MDMA)

## 2017-07-26 LAB — CRP SERPL-MCNC: <0.2 MG/DL — SIGNIFICANT CHANGE UP (ref 0–0.4)

## 2017-07-26 RX ADMIN — Medication 100 MILLIGRAM(S): at 20:18

## 2017-07-26 RX ADMIN — Medication 1 MILLIGRAM(S): at 08:33

## 2017-07-26 RX ADMIN — TIOTROPIUM BROMIDE 1 CAPSULE(S): 18 CAPSULE ORAL; RESPIRATORY (INHALATION) at 08:32

## 2017-07-26 RX ADMIN — QUETIAPINE FUMARATE 25 MILLIGRAM(S): 200 TABLET, FILM COATED ORAL at 17:59

## 2017-07-26 RX ADMIN — Medication 1 MILLIGRAM(S): at 20:18

## 2017-07-26 RX ADMIN — ALBUTEROL 2 PUFF(S): 90 AEROSOL, METERED ORAL at 08:32

## 2017-07-26 RX ADMIN — Medication 100 MILLIGRAM(S): at 08:31

## 2017-07-26 RX ADMIN — Medication 1 MILLIGRAM(S): at 14:16

## 2017-07-26 RX ADMIN — ALBUTEROL 2 PUFF(S): 90 AEROSOL, METERED ORAL at 14:40

## 2017-07-26 RX ADMIN — Medication 1 TABLET(S): at 08:33

## 2017-07-26 RX ADMIN — Medication 1 APPLICATION(S): at 20:18

## 2017-07-26 NOTE — PROGRESS NOTE BEHAVIORAL HEALTH - ORIENTATION OTHER
Pt slowly orienting to situation with staff support and reminders

## 2017-07-26 NOTE — PROGRESS NOTE BEHAVIORAL HEALTH - PRIMARY DX
Schizoaffective disorder, bipolar type
Bipolar disorder
Schizoaffective disorder, bipolar type
Schizoaffective disorder, bipolar type

## 2017-07-26 NOTE — PROGRESS NOTE BEHAVIORAL HEALTH - NSBHFUPINTERVALHXFT_PSY_A_CORE
Pt is a 22 yr-old SWF with a long h/o presumptive schizoaffective d/o -bipolar type and comorbid substance use d/o ( DOC is MDMA) . Pt admitted to  via the ED on 7/22/17 via police s/p OD of MDMA and pt clinical presentation of incoherent agitation and SI in the context of above disorders. Pt currently homeless and her mother ( Steph tel [phone] )living in [place] with pt's father and several of the pt's siblings) has custody of the pt's 2 young children ( 6 month-old Gabriela born with tox screen + cocaine and nearly 3 yr-old son Aaron). Pt had required 10 mg prn Ativan + Haldol while in the ED upon admission on 7/22/17 due to the pt's combative and assaultive behavior in the context of incoherent screaming and        Pt with a h/o onset of schizoaffective d/o at age 9 , with multiple subsequent hospital admissions, a h/o several suicide attempts  with most recent, severe s/p OD and ICU treatment ( pt on vent) at [facility] in 2017 )  Pt with a h/o ACT and AOT and a h/o treatment noncompliance, academic difficulties and a h/o SIB via cutting. Pt also with a h/o possible autism spectrum d/o.   Pt has received Abilify Maintenna 400 mg IM on 7/7/17 with next dose due on 8/7/17 for SAD- bipolar d/o.   Pt seen in the day room. Extremely anxious and tremulous ( likely exacerbated in the context of benzo withdrawal  ( see ED note 7/22/17).   Discussed plan with the pt to continue Abilify q monthly IM as above which pt finds clinically helpful and well tolerated. Also reviewed plan to add standing Ativan 1 mg po 4 times a day with slow taper and plan for DC to avoid pt symptoms of benzo withdrawal. Pt amenable to plan and believes she would benefit from inpatient rehab treatment   Pt with bilateral LE rash ( poison ivy?) Seen by hospitalist and prednisolone cream added as calamine lotion burned. Pt amenable to having writer contact pt's mother Steph and pt's outpatient doctor Anuja ( tel [phone] with  ( [phone]) ACT team . Pt with rambling, disjointed speech and thought confusion. Tremulous as above.
Pt seen multiple times throughout the day. Pt c/o right ear pain as above. Pt informed of plan as per Plastics to have pt continue Doxycycline , prn Motrin and cold and warm compresses to rt ear to decrease swelling and pain . Plan reviewed to recontact Plastics in a few days if  still unable to remove piercing from her rt ear..  Pt remains irritable, demanding and with impaired judgment and limited insight into the dangerous lifestyle in which she continues to operate. Pt continues to state, " I am not going to rehab! I'm not ready!" Writer and other staff continue to support and encourage the pt to abstain from all drug use and to reconsider inpatient drug rehab  in order to more effectively deal with the pt's severe abuse of MDMA, cocaine and other hallucinogens.   Pt's SW Ms Keita continuing to try to reach out to pt's outpatient clinic ACT team staff in an effort to have them meet with the pt to further encourage pt to seek  a safer disposition plan.  Pt continues to deny SI / HI /AH /VH. Pt slept reasonably well and is eating all meals provided. Staff continue to encourage the pt to attend therapy groups on the unit despite pt's ongoing resistance.   Ativan taper proceeding in preparation for pt requested DC from hospital. [facility] court retention hearing papers submitted by hospital at pt's request. Though writer and all hospital staff have continued to urge the pt to remain in hospital with more direct pt DC and entry into an inpatient drug rehab program, the pt continues to decline and drug rehab program attendance cannot be legally mandated for this pt at this time.    Pt submitted letter on 7/25/17 requesting hospital DC. Pt met with [facility]  on 7/26/17 and court hearing scheduled for 7/28/17.  Ongoing staff efforts to engage pt in safety planning now and after pt DC from hospital.
Pt is a 22 yr-old SWF with a long h/o presumptive schizoaffective d/o -bipolar type and comorbid substance use d/o ( DOC is MDMA) . Pt admitted to  via the ED on 7/22/17 via police s/p OD of MDMA and pt clinical presentation of incoherent agitation and SI in the context of above disorders. Pt currently homeless and her mother ( Steph tel [phone] )living in [place] with pt's father and several of the pt's siblings) has custody of the pt's 2 young children ( 6 month-old Gabriela born with tox screen + cocaine and nearly 3 yr-old son Aaron). Pt had required 10 mg prn Ativan + Haldol while in the ED upon admission on 7/22/17 due to the pt's combative and assaultive behavior in the context of incoherent screaming and        Pt with a h/o onset of schizoaffective d/o at age 9 , with multiple subsequent hospital admissions, a h/o several suicide attempts  with most recent, severe s/p OD and ICU treatment ( pt on vent) at [facility] in 2017 )  Pt with a h/o ACT and AOT and a h/o treatment noncompliance, academic difficulties and a h/o SIB via cutting. Pt also with a h/o possible autism spectrum d/o.   Pt has received Abilify Maintenna 400 mg IM on 7/7/17 with next dose due on 8/7/17 for SAD- bipolar d/o.   Pt seen in the day room. Extremely anxious and tremulous ( likely exacerbated in the context of benzo withdrawal  ( see ED note 7/22/17).   Discussed plan with the pt to continue Abilify q monthly IM as above which pt finds clinically helpful and well tolerated. Also reviewed plan to add standing Ativan 1 mg po 4 times a day with slow taper and plan for DC to avoid pt symptoms of benzo withdrawal. Pt amenable to plan and believes she would benefit from inpatient rehab treatment   Pt with bilateral LE rash ( poison ivy?) Seen by hospitalist and prednisolone cream added as calamine lotion burned. Pt amenable to having writer contact pt's mother Steph and pt's outpatient doctor Anuja ( tel [phone] with  ( [phone]) ACT team . Pt with rambling, disjointed speech and thought confusion. Tremulous as above.
23 yo white female, undomiciled, mother of 2 children ages 2 1/2 and 6mo, in, custody of Pt mother, Steph,  who is primary historian at this time, as Pt has been sedated with Ativan 10 mg in intervals, since admission to ED, lost custody due to a pos tox  for cocaine when youngest born, no longer able to live with mother, PPH Schizoaffective Disorder Bipolar type, polysubstance abuse, cocaine dependence (ecstasy, LSD)    learning disability,  40-50 past psych admissions, h/o cutting, one recent SA last year when pregnant tried to walk on train tracks, hospitalized at [facility] x 1 months, OD LSD  2/12 mo ago, found unresponsive in front of hospital, on vent in ICU at [facility], 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

## 2017-07-26 NOTE — PROGRESS NOTE BEHAVIORAL HEALTH - NSBHCHARTREVIEWLAB_PSY_A_CORE FT
MEDICATIONS  (STANDING):  ALBUTerol    90 MICROgram(s) HFA Inhaler 2 Puff(s) Inhalation every 6 hours  tiotropium 18 MICROgram(s) Capsule 1 Capsule(s) Inhalation daily  nicotine -  14 mG/24Hr(s) Patch 1 patch Transdermal daily  levoFLOXacin  Tablet 500 milliGRAM(s) Oral every 24 hours  hydrocortisone 1% Ointment 1 Application(s) Topical three times a day  multivitamin 1 Tablet(s) Oral daily  LORazepam     Tablet 1 milliGRAM(s) Oral four times a day    MEDICATIONS  (PRN):  diphenhydrAMINE   Injectable 50 milliGRAM(s) IntraMuscular every 6 hours PRN Agitation  LORazepam   Injectable 2 milliGRAM(s) IntraMuscular once PRN Agitation  QUEtiapine 25 milliGRAM(s) Oral four times a day PRN agitation  guaiFENesin    Syrup 100 milliGRAM(s) Oral every 6 hours PRN Cough  calamine Lotion 1 Application(s) Topical four times a day PRN Itching  acetaminophen   Tablet 650 milliGRAM(s) Oral every 6 hours PRN For Temp greater than 38 C (100.4 F)  docusate sodium 100 milliGRAM(s) Oral daily PRN Constipation  magnesium hydroxide Suspension 30 milliLiter(s) Oral daily PRN Constipation  aluminum hydroxide/magnesium hydroxide/simethicone Suspension 30 milliLiter(s) Oral every 6 hours PRN Dyspepsia
MEDICATIONS  (STANDING):  ALBUTerol    90 MICROgram(s) HFA Inhaler 2 Puff(s) Inhalation every 6 hours  tiotropium 18 MICROgram(s) Capsule 1 Capsule(s) Inhalation daily  nicotine -  14 mG/24Hr(s) Patch 1 patch Transdermal daily  levoFLOXacin  Tablet 500 milliGRAM(s) Oral every 24 hours  multivitamin 1 Tablet(s) Oral daily  clobetasol 0.05% Cream 1 Application(s) Topical two times a day  doxycycline hyclate Capsule 100 milliGRAM(s) Oral every 12 hours  LORazepam     Tablet 1 milliGRAM(s) Oral three times a day    MEDICATIONS  (PRN):  diphenhydrAMINE   Injectable 50 milliGRAM(s) IntraMuscular every 6 hours PRN Agitation  LORazepam   Injectable 2 milliGRAM(s) IntraMuscular once PRN Agitation  QUEtiapine 25 milliGRAM(s) Oral four times a day PRN agitation  guaiFENesin    Syrup 100 milliGRAM(s) Oral every 6 hours PRN Cough  calamine Lotion 1 Application(s) Topical four times a day PRN Itching  acetaminophen   Tablet 650 milliGRAM(s) Oral every 6 hours PRN For Temp greater than 38 C (100.4 F)  docusate sodium 100 milliGRAM(s) Oral daily PRN Constipation  magnesium hydroxide Suspension 30 milliLiter(s) Oral daily PRN Constipation  aluminum hydroxide/magnesium hydroxide/simethicone Suspension 30 milliLiter(s) Oral every 6 hours PRN Dyspepsia
MEDICATIONS  (STANDING):  ALBUTerol    90 MICROgram(s) HFA Inhaler 2 Puff(s) Inhalation every 6 hours  tiotropium 18 MICROgram(s) Capsule 1 Capsule(s) Inhalation daily  nicotine -  14 mG/24Hr(s) Patch 1 patch Transdermal daily  levoFLOXacin  Tablet 500 milliGRAM(s) Oral every 24 hours  hydrocortisone 1% Ointment 1 Application(s) Topical three times a day  multivitamin 1 Tablet(s) Oral daily  LORazepam     Tablet 1 milliGRAM(s) Oral four times a day    MEDICATIONS  (PRN):  diphenhydrAMINE   Injectable 50 milliGRAM(s) IntraMuscular every 6 hours PRN Agitation  LORazepam   Injectable 2 milliGRAM(s) IntraMuscular once PRN Agitation  QUEtiapine 25 milliGRAM(s) Oral four times a day PRN agitation  guaiFENesin    Syrup 100 milliGRAM(s) Oral every 6 hours PRN Cough  calamine Lotion 1 Application(s) Topical four times a day PRN Itching  acetaminophen   Tablet 650 milliGRAM(s) Oral every 6 hours PRN For Temp greater than 38 C (100.4 F)  docusate sodium 100 milliGRAM(s) Oral daily PRN Constipation  magnesium hydroxide Suspension 30 milliLiter(s) Oral daily PRN Constipation  aluminum hydroxide/magnesium hydroxide/simethicone Suspension 30 milliLiter(s) Oral every 6 hours PRN Dyspepsia
07-22    140  |  107  |  12  ----------------------------<  108<H>  3.8   |  24  |  0.94    Ca    9.4      22 Jul 2017 02:31      CBC Full  -  ( 22 Jul 2017 02:31 )  WBC Count : 16.9 K/uL  Hemoglobin : 13.2 g/dL  Hematocrit : 39.4 %  Platelet Count - Automated : 519 K/uL  Mean Cell Volume : 80.4 fl  Mean Cell Hemoglobin : 27.0 pg  Mean Cell Hemoglobin Concentration : 33.5 gm/dL  Auto Neutrophil # : 13.5 K/uL  Auto Lymphocyte # : 2.5 K/uL  Auto Monocyte # : 0.5 K/uL  Auto Eosinophil # : 0.1 K/uL  Auto Basophil # : 0.2 K/uL  Auto Neutrophil % : 80.2 %  Auto Lymphocyte % : 14.5 %  Auto Monocyte % : 3.3 %  Auto Eosinophil % : 0.9 %  Auto Basophil % : 1.2 %

## 2017-07-26 NOTE — PROGRESS NOTE BEHAVIORAL HEALTH - PROBLEM SELECTOR PROBLEM 3
Rash
Cocaine use disorder, moderate, in controlled environment

## 2017-07-26 NOTE — PROGRESS NOTE BEHAVIORAL HEALTH - BODY HABITUS
Average build/Well nourished
Well nourished
Average build/Well nourished
Average build/Well nourished

## 2017-07-26 NOTE — PROGRESS NOTE BEHAVIORAL HEALTH - PROBLEM SELECTOR PLAN 4
1. Ongoing staff support and encouragement  2. Pt urged to attend groups including psychoeducation related to substance use d/o and risks of treatment nonadherence( ie more frequent clinical relapse and need for readmission to hospital)
1. Ongoing staff support and encouragement  2. Pt urged to attend groups including psychoeducation related to substance use d/o and risks of treatment nonadherence( ie more frequent clinical relapse and need for readmission to hospital)  3. Pt seen x 2 by hospitalist for long standing infected rt ear surrounding old ear piercing.  4.Doxycline course as above 100 mg po q 12   5.Levaquin course 500 mg po q am ( acute bronchitis)  6.New appointment scheduled for pt on 7/31/17 at 12 noon at [facility] with ACT team therapist Jaqueline Vick  ( tel [phone])
1. Ongoing staff support and encouragement  2. Pt urged to attend groups including psychoeducation related to substance use d/o and risks of treatment nonadherence( ie more frequent clinical relapse and need for readmission to hospital)
continue with abilify

## 2017-07-26 NOTE — PROGRESS NOTE BEHAVIORAL HEALTH - OTHER
pt with dyed pink hair and small nasal piercing to left nares, butterfly  tattoos to both arms. Inflamed , infected left ear surrounding old piercing restless, anxious, only now slowly  comprehending how and why she was admitted reported pt h/o VH  only with hallucinogens ( LSD) and MDMA) angrily clipped and terse at times evidence of mild articulation difficulties though pt denies, pt appears preoccupied with drug use   as pt stated, " Janie helps me calm down"

## 2017-07-26 NOTE — PROGRESS NOTE BEHAVIORAL HEALTH - NSBHFUPINTERVALCCFT_PSY_A_CORE
' I just want to leave! I can find my own rehab. I'm just not ready now."      Pt submitted a  court request for her release from hospital.( 7/25/17)
' I want to leave now! Go to the beach. Relax a little."                                        NOTE   Pt seen by hospitalist on 7/24 /17 for H and P and again on 7/25/17  for pt c/o right ear pain and swelling around an old ear piercing. Hospitalist  began the pt on a course of Doxycycline 100 mg po q 12 and recommended Plastic Surgery consult  . Writer spoke with plastic surgeon Dr Hughes at [facility] ( tel [phone]. ) He recommended continuing with antibiotic and prn Motrin  and adding alternating cold and warm compresses to pt's right ear q 2 hrs to decrease pain and swelling. If after 2 -3 days, pt still unable to remove ear piercing, to recontact Plastic Surgery who will consult.
"  What happened? To tell you the truth, I don't even remember what happened. "
"Lost my children, homeless and still addicted to drugs."

## 2017-07-26 NOTE — PROGRESS NOTE BEHAVIORAL HEALTH - NSBHCHARTREVIEWVS_PSY_A_CORE FT
Vital Signs Last 24 Hrs  T(C): 36.6 (25 Jul 2017 08:21), Max: 36.6 (25 Jul 2017 08:21)  T(F): 97.9 (25 Jul 2017 08:21), Max: 97.9 (25 Jul 2017 08:21)  HR: 90 (25 Jul 2017 09:30) (65 - 90)  BP: 109/66 (25 Jul 2017 08:21) (109/66 - 109/66)  BP(mean): --  RR: 16 (25 Jul 2017 08:21) (16 - 16)  SpO2: 99% (25 Jul 2017 09:30) (98% - 100%)
Vital Signs Last 24 Hrs  T(C): 36.7 (26 Jul 2017 07:37), Max: 36.7 (26 Jul 2017 07:37)  T(F): 98 (26 Jul 2017 07:37), Max: 98 (26 Jul 2017 07:37)  HR: 75 (26 Jul 2017 07:37) (75 - 86)  BP: 114/90 (26 Jul 2017 07:37) (114/90 - 114/90)  BP(mean): --  RR: 16 (26 Jul 2017 07:37) (16 - 16)  SpO2: 100% (26 Jul 2017 07:37) (98% - 100%)
Vital Signs Last 24 Hrs  T(C): 36.5 (24 Jul 2017 08:47), Max: 36.5 (24 Jul 2017 08:47)  T(F): 97.7 (24 Jul 2017 08:47), Max: 97.7 (24 Jul 2017 08:47)  HR: 125 (24 Jul 2017 08:47) (125 - 125)  BP: 129/87 (24 Jul 2017 08:47) (129/87 - 129/87)  BP(mean): --  RR: 14 (24 Jul 2017 08:47) (14 - 14)  SpO2: 100% (24 Jul 2017 08:47) (100% - 100%)
Vital Signs Last 24 Hrs  T(C): --  T(F): --  HR: --  BP: --  BP(mean): --  RR: --  SpO2: --

## 2017-07-26 NOTE — PROGRESS NOTE BEHAVIORAL HEALTH - PROBLEM SELECTOR PLAN 2
with greenish phlegm and elevated WBCs on admission, likely due to Acute Bronchitis with Bronchospasm.  Current smoker  Check CXR to r/o PNA. Pt had admission to IcU and intubated 2-3 months ago  Repeat CBC  Start Levaquin  PO  Cough med  Start Spiriva and Albuterol   Monitor response
1. Pt encouraged to attend therapy groups including those with focus on substance use d/o.  2.Inpatient drug rehab to be discussed with the pt after clinically stabilized inpatient for SAD- bipolar type.
1. Pt encouraged to attend therapy groups including those with focus on substance use d/o.  2.Inpatient drug rehab to continue to  be discussed with the pt after clinically stabilized inpatient for SAD- bipolar type.
1. Pt encouraged to attend therapy groups including those with focus on substance use d/o.  2.Inpatient drug rehab to be discussed with the pt after clinically stabilized inpatient for SAD- bipolar type.

## 2017-07-26 NOTE — PROGRESS NOTE BEHAVIORAL HEALTH - PROBLEM SELECTOR PLAN 1
on abilify
1, Continue q monthly Abilify Maintenna 400 mg IM ( last received 7/7/17)   2.Ativan 1 mg po q id with plan to taper and DC ( to decrease withdrawal symptoms - see ED note 7/22/17)  3. Suicide alert observation status for pt safety  4.Pt encouraged to attend groups including coping skills and substance use d/o focus groups  5.Pt gave verbal consent for staff to contact pt's ACT team , pt's family  6.Disposition planning ongoing to include recommendation for pt drug rehab
1, Continue q monthly Abilify Maintenna 400 mg IM ( last received 7/7/17)   2.Ativan taper as above to 1 mg po tid  with plan to taper  by 1 mg po q day until pt hospital DC on 7/28/17 and DC ( to decrease withdrawal symptoms - see ED note 7/22/17)  3. Suicide alert observation status for pt safety  4.Pt encouraged to attend groups including coping skills and substance use d/o focus groups  5.Pt gave verbal consent for staff to contact pt's ACT team , pt's family  6.Disposition planning ongoing to include recommendation for pt drug rehab
1, Continue q monthly Abilify Maintenna 400 mg IM ( last received 7/7/17)   2.Ativan 1 mg po q id with plan to taper and DC ( to decrease withdrawal symptoms - see ED note 7/22/17)  3. Suicide alert observation status for pt safety  4.Pt encouraged to attend groups including coping skills and substance use d/o focus groups  5.Pt gave verbal consent for staff to contact pt's ACT team , pt's family  6.Disposition planning ongoing to include recommendation for pt drug rehab

## 2017-07-26 NOTE — PROGRESS NOTE BEHAVIORAL HEALTH - NSBHADMITIPBHPROVFT_PSY_A_CORE
Dr Kelby Licea ( [phone]

## 2017-07-26 NOTE — PROGRESS NOTE BEHAVIORAL HEALTH - NSBHLEGALSTATUS_PSY_A_CORE
9.39 (Emergency)/PT WITH H/O TREATMENT NONCOMPLIANCE, SERIOUS SUICIDE ATTEMPTS IN THE CONTEXT OF SEVERE DRUG ABUSE
PT WITH H/O TREATMENT NONCOMPLIANCE, SERIOUS SUICIDE ATTEMPTS IN THE CONTEXT OF SEVERE DRUG ABUSE/9.39 (Emergency)
9.39 (Emergency)
PT WITH H/O TREATMENT NONCOMPLIANCE, SERIOUS SUICIDE ATTEMPTS IN THE CONTEXT OF SEVERE DRUG ABUSE/9.39 (Emergency)

## 2017-07-26 NOTE — PROGRESS NOTE BEHAVIORAL HEALTH - NSBHADMITIPOBSFT_PSY_A_CORE
Suicide alert status due to pt recent OD MDMA and pt h/o suicide attempts in the context of med noncompliance and comorid substance use d/o
Suicide alert status due to pt recent OD MDMA and pt h/o suicide attempts in the context of med noncompliance and comorid substance use d/o
pt with impulsity , drug use. lability of mood and affect
Suicide alert status due to pt recent OD MDMA and pt h/o suicide attempts in the context of med noncompliance and comorid substance use d/o

## 2017-07-26 NOTE — PROGRESS NOTE BEHAVIORAL HEALTH - THOUGHT PROCESS
Overinclusive/Circumstantial/Illogical/Impaired reasoning/Disorganized
Impaired reasoning/Linear
Overinclusive/Circumstantial/Illogical/Impaired reasoning/Disorganized
Linear

## 2017-07-26 NOTE — PROGRESS NOTE BEHAVIORAL HEALTH - PROBLEM SELECTOR PROBLEM 4
Bipolar affective disorder
Nonadherence to medical treatment

## 2017-07-26 NOTE — PROGRESS NOTE BEHAVIORAL HEALTH - SECONDARY DX1
Hallucinogen use with intoxication
Suicidal behavior with attempted self-injury
Hallucinogen use with intoxication
Hallucinogen use with intoxication

## 2017-07-26 NOTE — PROGRESS NOTE BEHAVIORAL HEALTH - NSBHADMITMEDEDUDETAILS_A_CORE FT
informed consent process ongoing as to continued IM Abilify , Ativan taper and need for drug rehab

## 2017-07-26 NOTE — PROGRESS NOTE BEHAVIORAL HEALTH - SECONDARY DX2
Cocaine use disorder, moderate, in controlled environment
Drug intoxication, with unspecified complication
Cocaine use disorder, moderate, in controlled environment
Cocaine use disorder, moderate, in controlled environment

## 2017-07-26 NOTE — PROGRESS NOTE BEHAVIORAL HEALTH - PROBLEM SELECTOR PLAN 3
likely poison ivy  will start calamine lotion, if no improvement will add steroids topically
1. Substance use focus therapy groups.  2. Drug rehab reentry as above

## 2017-07-26 NOTE — PROGRESS NOTE BEHAVIORAL HEALTH - NSBHADMITDANGERSELF_PSY_A_CORE
s/p apparent OD drugs ( MDMA)/suicidal behavior
s/p apparent OD drugs ( MDMA)/suicidal behavior
suicidal behavior
s/p apparent OD drugs ( MDMA)/suicidal behavior

## 2017-07-26 NOTE — PROGRESS NOTE ADULT - SUBJECTIVE AND OBJECTIVE BOX
CC: was brought for agitation/ cough (23 Jul 2017 11:02)    HPI:  21 y/o F brought to ED by Police  agitated  with overdose and suicidal thoughts. Police found her outside home address after neighbors called complaining that Pt was agitated and acting out.   Police states they found her screaming, and yelling on PCP, cocaine and roofies. In ED she was  very agitated with the staff and acting like she is seeing things. Pt had to be physically and medically restrained.  Her Mother was  called and she  stated she has multiple personality disorder and schizophrenia. She  had multiple psych admissions in the past and multiple SA  starting age 9 (40-50 admissions)  last about 2-3 months ago she was admitted to ICU at [facility] and required intubation. Pt was evaluated by psychiatry and admitted to .   Today Pt id osbaldo and comfortable in bed agreed on conversation and   exam. C/o having cough with greenish phlegm last 3 days, no fevers, no SOB, but radha intermittent wheezing. Denies/o Asthma. Also reports having rash on her feet  and lower legs, itchy and tender to touch,  not sure how developed, no h/o eczema. Pt denies Dysuria or constipation, no HA, no CP (23 Jul 2017 11:02)    INTERVAL HPI/ OVERNIGHT EVENTS:  Pt was seen and examined, sleeping comfortably, agreed ear to be examined. As per RN was refusing TX recommended by Sx. No fevers. Vitals stable       Vital Signs Last 24 Hrs  T(C): 36.7 (26 Jul 2017 07:37), Max: 36.7 (26 Jul 2017 07:37)  T(F): 98 (26 Jul 2017 07:37), Max: 98 (26 Jul 2017 07:37)  HR: 75 (26 Jul 2017 07:37) (75 - 86)  BP: 114/90 (26 Jul 2017 07:37) (114/90 - 114/90)  BP(mean): --  RR: 16 (26 Jul 2017 07:37) (16 - 16)  SpO2: 100% (26 Jul 2017 07:37) (98% - 100%)    All other review of systems negative, except as noted in HPI	      PHYSICAL EXAM:  General: Well developed; well nourished; in no acute distress    Head: Normocephalic; atraumatic  ENMT:  R tragus with piercing, earing impacted into tragus,  surrounding edema and  erythema is better, tragus less  tender to manipulation, +dry purulent discharge  in external canal   Neck: Supple;   Respiratory: Decreased Breath sounds, No wheezes, rales or rhonchi  Cardiovascular: Regular rate and rhythm. S1 and S2 Normal; No murmurs  Gastrointestinal: Soft non-tender non-distended; Normal bowel sounds  Extremities: Normal range of motion, No  edemay  Neurological: Alert, somnolent   Skin: Warm and dry.  R dorsal foot rash improving   Lymph Nodes: No acute cervical adenopathy      MEDICATIONS  (STANDING):  ALBUTerol    90 MICROgram(s) HFA Inhaler 2 Puff(s) Inhalation every 6 hours  tiotropium 18 MICROgram(s) Capsule 1 Capsule(s) Inhalation daily  nicotine -  14 mG/24Hr(s) Patch 1 patch Transdermal daily  levoFLOXacin  Tablet 500 milliGRAM(s) Oral every 24 hours  multivitamin 1 Tablet(s) Oral daily  clobetasol 0.05% Cream 1 Application(s) Topical two times a day  doxycycline hyclate Capsule 100 milliGRAM(s) Oral every 12 hours  LORazepam     Tablet 1 milliGRAM(s) Oral three times a day    MEDICATIONS  (PRN):  diphenhydrAMINE   Injectable 50 milliGRAM(s) IntraMuscular every 6 hours PRN Agitation  LORazepam   Injectable 2 milliGRAM(s) IntraMuscular once PRN Agitation  QUEtiapine 25 milliGRAM(s) Oral four times a day PRN agitation  guaiFENesin    Syrup 100 milliGRAM(s) Oral every 6 hours PRN Cough  calamine Lotion 1 Application(s) Topical four times a day PRN Itching  acetaminophen   Tablet 650 milliGRAM(s) Oral every 6 hours PRN For Temp greater than 38 C (100.4 F)  docusate sodium 100 milliGRAM(s) Oral daily PRN Constipation  magnesium hydroxide Suspension 30 milliLiter(s) Oral daily PRN Constipation  aluminum hydroxide/magnesium hydroxide/simethicone Suspension 30 milliLiter(s) Oral every 6 hours PRN Dyspepsia              RADIOLOGY & ADDITIONAL TESTS:    EXAM:  CHEST SINGLE VIEW FRONTAL                        PROCEDURE DATE:  07/23/2017   FINDINGS/  IMPRESSION:        The lungs are clear.  No pleural abnormality is seen.    Cardiomediastinal silhouette is intact.        Assessment and Plan:   Assessment:  · Assessment		  22 y.o female with PMH of Bipolar Depression and Schizoaffective Disorder admitted to Psychiatric floor for Suicidal Thoughts and risk behaviour with multiple SA in the past.      Problem/Plan - 1:  Suicidal behavior with attempted self-injury.     management as per psych.     Problem/Plan - 2:  Cough with greenish phlegm and elevated WBCs on admission, likely due to Acute Bronchitis with Bronchospasm.  Current smoker  CXR  neg for  PNA.   Refused blood draws  C/w  Levaquin  PO  Cough med PRN   Start Spiriva and Albuterol   Improving     Problem/Plan - 3:  LE  Rash likely poison ivy. [place] the woods [place] per Rn   Did not tolerate calamine lotion  Start high dose topical steroids x 7 days   If no improvement will need derm eval       Problem/Plan - 4:  R face/R auricle cellulitis   due to impacted foreign body  No fevers   C/w Levaquin,  Doxy added  Tylenol for pain   Labs: leukocytosis improved from admission, mildly elevated ESR/CRP  Plastic SX eval Pending  ID eval       Thank you for consult, will f/u

## 2017-07-26 NOTE — PROGRESS NOTE BEHAVIORAL HEALTH - SUMMARY
Pt is a 21 yo single wf homeless x 6 mo on SSI for Schizoaffective Disorder/Bipolar type on AOT and maintained by ACT team for weekly visits, with cocaine and MDMA dependence bib SCP after an intentional OD of large amt of MDMA.  Pt currently denying SI or need for psych admission however has poor insight and judgement in regard to self care, and last psych admission did not believe she needed admission after SA.  Pt is seeking drug treatment but due to her impaired functioning and self care, high risk behavior, impaired insight and poor judgement, has diminished ability to make safe decisions.  Discussed with Dr Eng and are recommending inpt psych admission, as Pt is a potential danger to self, due to impulsivity and high risk behavior, followed by rehab.  Will  be admitted to  on 939 status to Dr Eng.  Mother supportive of psych admission
Pt is a 23 yoswf homeless x 6 mo on SSI for Schizoaffective Disorder/Bipolar type on AOT and maintained by ACT team for weekly visits, with cocaine and MDMA dependence bib SCP after an intentional OD of large amt of MDMA.  Pt currently denying SI or need for psych admission however has poor insight and judgement in regard to self care, and last psych admission did not believe she needed admission after SA.  Pt is seeking drug treatment but due to her impaired functioning and self care, high risk behavior, impaired insight and poor judgement, has diminished ability to make safe decisions.  Discussed with Dr Eng and are recommending inpt psych admission, as Pt is a potential danger to self, due to impulsivity and high risk behavior, followed by rehab.  Will  be admitted to  on 939 status to Dr Eng.  Mother supportive of psych admission
Pt is a 21 yo single wf homeless x 6 mo on SSI for Schizoaffective Disorder/Bipolar type on AOT and maintained by ACT team for weekly visits, with cocaine and MDMA dependence bib SCP after an intentional OD of large amt of MDMA.  Pt continues to deny SI / HI or psychotic symptoms  or need for psych admission . The pt continues with   poor insight and judgement in regard to self care, and last psych admission did not believe she needed admission after SA
Pt is a 23 yo single wf homeless x 6 mo on SSI for Schizoaffective Disorder/Bipolar type on AOT and maintained by ACT team for weekly visits, with cocaine and MDMA dependence bib SCP after an intentional OD of large amt of MDMA.  Pt currently denying SI or need for psych admission however has poor insight and judgement in regard to self care, and last psych admission did not believe she needed admission after SA.  Pt is seeking drug treatment but due to her impaired functioning and self care, high risk behavior, impaired insight and poor judgement, has diminished ability to make safe decisions.  Discussed with Dr Eng and are recommending inpt psych admission, as Pt is a potential danger to self, due to impulsivity and high risk behavior, followed by rehab.  Will  be admitted to  on 939 status to Dr Eng.  Mother supportive of psych admission

## 2017-07-26 NOTE — PROGRESS NOTE BEHAVIORAL HEALTH - PROBLEM SELECTOR PROBLEM 2
Cough
Hallucinogen use with intoxication

## 2017-07-26 NOTE — PROGRESS NOTE BEHAVIORAL HEALTH - RISK ASSESSMENT
Pt is high risk of self harm in context of past and impulsive SA, sexual promiscuity and prostitution, excessive use of drugs and ongoing suicidal behavior. Pt has however recently contacted [facility] and showing interest in addressing addiction.
Pt is high risk of self harm in context of past and impulsive SA, sexual promiscuity and prostitution, excessive use of drugs and ongoing suicidal behavior. Pt has however recently contacted [facility] and showing interest in addressing addiction.
Pt is high risk of self harm in context of past and impulsive SA, sexual promiscuity and prostitution  with ,active , heavy  drug use  with pt reported lack of current   motivation to stop abusing MDMA, cocaine  or any other drugs of abuse and other hallucinogens. Pt had however recently contacted [facility] and showing interest in addressing addiction. Pt also stated she would consider drug rehab "later  when I'm ready."
Pt is high risk of self harm in context of past and impulsive SA, sexual promiscuity and prostitution, excessive use of drugs and ongoing suicidal behavior. Pt has however recently contacted [facility] and showing interest in addressing addiction.

## 2017-07-27 VITALS
OXYGEN SATURATION: 100 % | SYSTOLIC BLOOD PRESSURE: 119 MMHG | DIASTOLIC BLOOD PRESSURE: 79 MMHG | RESPIRATION RATE: 14 BRPM | HEART RATE: 108 BPM | TEMPERATURE: 98 F

## 2017-07-27 RX ADMIN — Medication 100 MILLIGRAM(S): at 12:29

## 2017-07-27 NOTE — DISCHARGE NOTE BEHAVIORAL HEALTH - NSBHDCPURPOSE1FT_PSY_A_CORE
Psychiatric Follow Up appointment with Jaqueline Vick of the [facility] ACT team on Mon. 7/31/17 at 12:00 noon.

## 2017-07-27 NOTE — DISCHARGE NOTE BEHAVIORAL HEALTH - NSBHDCADMRISKREASONS_PSY_A_CORE
Poor engagement in outpt treatment/High utilizer of Inpateint/ED services/Co-occur mental health and medical/Insurance/financial Issues/Lack of social supports/Poor residential stability/Non-adherence with medications/Co-occur mental health and subst use/Co-occur mental health and dev disability

## 2017-07-27 NOTE — DISCHARGE NOTE BEHAVIORAL HEALTH - HPI (INCLUDE ILLNESS QUALITY, SEVERITY, DURATION, TIMING, CONTEXT, MODIFYING FACTORS, ASSOCIATED SIGNS AND SYMPTOMS)
·  Pt is a 22 yr-old SWF with a long h/o presumptive schizoaffective d/o -bipolar type and comorbid substance use d/o ( DOC is MDMA) . Pt admitted to  via the ED on 7/22/17 via police s/p OD of MDMA and pt clinical presentation of incoherent agitation and SI in the context of above disorders. Pt currently homeless and her mother ( Steph tel [phone] )living in [place] with pt's father and several of the pt's siblings) has custody of the pt's 2 young children ( 6 month-old Gabriela born with tox screen + cocaine and nearly 3 yr-old son Aaron). Pt had required 10 mg prn Ativan + Haldol while in the ED upon admission on 7/22/17 due to the pt's combative and assaultive behavior in the context of incoherent screaming and        Pt with a h/o onset of schizoaffective d/o at age 9 , with multiple subsequent hospital admissions, a h/o several suicide attempts  with most recent, severe s/p OD and ICU treatment ( pt on vent) at [facility] in 2017 )  Pt with a h/o ACT and AOT and a h/o treatment noncompliance, academic difficulties and a h/o SIB via cutting. Pt also with a h/o possible autism spectrum d/o.   Pt has received Abilify Maintenna 400 mg IM on 7/7/17 with next dose due on 8/7/17 for SAD- bipolar d/o.   Pt seen in the day room. Extremely anxious and tremulous ( likely exacerbated in the context of benzo withdrawal  ( see ED note 7/22/17).   Discussed plan with the pt to continue Abilify q monthly IM as above which pt finds clinically helpful and well tolerated. Also reviewed plan to add standing Ativan 1 mg po 4 times a day with slow taper and plan for DC to avoid pt symptoms of benzo withdrawal. Pt amenable to plan and believes she would benefit from inpatient rehab treatment   Pt with bilateral LE rash ( poison ivy?) Seen by hospitalist and prednisolone cream added as calamine lotion burned. Pt amenable to having writer contact pt's mother Steph and pt's outpatient doctor Anuja ( tel [phone] with  ( [phone]) ACT team .    Pt also treated for acute bronchitis with 5 day course of Levaquin ( completed 7/27/17) Pt begun on a 7 day course of Doxycycline for treatment of a right ear soft tissue chronic infection( related to piercing) Pt has 4 days left of 7 day course.   On 7/25/17, the pt submitted a request for hospital release. Despite ongoing, exhaustive staff efforts to encourage the pt to remain in hospital with plan for direct transfer to an inpatient ·  Pt is a 22 yr-old SWF with a long h/o presumptive schizoaffective d/o -bipolar type and comorbid substance use d/o ( DOC is MDMA) . Pt admitted to  via the ED on 7/22/17 via police s/p OD of MDMA and pt clinical presentation of incoherent agitation and SI in the context of above disorders. Pt currently homeless and her mother ( Steph tel [phone] )living in [place] with pt's father and several of the pt's siblings) has custody of the pt's 2 young children ( 6 month-old Gabriela born with tox screen + cocaine and nearly 3 yr-old son Aaron). Pt had required 10 mg prn Ativan + Haldol while in the ED upon admission on 7/22/17 due to the pt's combative and assaultive behavior in the context of incoherent screaming and        Pt with a h/o onset of schizoaffective d/o at age 9 , with multiple subsequent hospital admissions, a h/o several suicide attempts  with most recent, severe s/p OD and ICU treatment ( pt on vent) at [facility] in 2017 )  Pt with a h/o ACT and AOT and a h/o treatment noncompliance, academic difficulties and a h/o SIB via cutting. Pt also with a h/o possible autism spectrum d/o.   Pt has received Abilify Maintenna 400 mg IM on 7/7/17 with next dose due on 8/7/17 for SAD- bipolar d/o.   Pt seen in the day room. Extremely anxious and tremulous ( likely exacerbated in the context of benzo withdrawal  ( see ED note 7/22/17).   Discussed plan with the pt to continue Abilify q monthly IM as above which pt finds clinically helpful and well tolerated. Also reviewed plan to add standing Ativan 1 mg po 4 times a day with slow taper and plan for DC to avoid pt symptoms of benzo withdrawal. Pt amenable to plan and believes she would benefit from inpatient rehab treatment   Pt with bilateral LE rash ( poison ivy?) Seen by hospitalist and prednisolone cream added as calamine lotion burned. Pt amenable to having writer contact pt's mother Steph and pt's outpatient doctor Anuja ( tel [phone] with  ( [phone]) ACT team .    Pt also treated for acute bronchitis with 5 day course of Levaquin ( completed 7/27/17) Pt begun on a 7 day course of Doxycycline for treatment of a right ear soft tissue chronic infection( related to piercing) Pt has 4 days left of 7 day course.   On 7/25/17, the pt submitted a request for hospital release. Despite ongoing, exhaustive staff efforts to encourage the pt to remain in hospital with plan for direct transfer to an inpatient drug rehab program, the pt  declined this recommendation. As court mandated drug treatment for this pt is not presently an option, staff reviewed the pt's clinical situation and pt will  be discharged to her boyfriend's home in [place]. Pt's ACT team and Dr Licea ( [phone]) were contacted by this writer and by hospitals SW Ms Keita and case/ treatment options reviewed at length.      Plan  1. Pt for  DC from hospital on 7/27/17  2.Follow up appointment at [facility] with MARYBEL Ghosh on 7/31/17 at 12 noon   3.Doxycycline 100 mg po q 12 hrs ( 4 days remaining of 7 day course

## 2017-07-27 NOTE — DISCHARGE NOTE BEHAVIORAL HEALTH - NSBHDCVIOLPROTECTFT_PSY_A_CORE
pt has allowed family and outside treatment team including ACT at [facility]  and is thus familiar with being safely cared for. Unfortunately, the pt remains ambivalent about maintaining a drug free lifestyle and she remains largely unwilling/ unmotivated at this time to reenter a drug rehab program as strongly recommended by hospital staff here and at pt's outpatient clinic via ACT team and others.

## 2017-07-27 NOTE — DISCHARGE NOTE BEHAVIORAL HEALTH - NSTOBACCOHOTLINE_GEN_A_NCS
[facility] Smokers Quitline ([phone]) [facility] Smokers Quitline ([phone])

## 2017-07-27 NOTE — DISCHARGE NOTE BEHAVIORAL HEALTH - NSBHDCVIOLFCTRMIT_PSY_A_CORE
pt has 24/7 outreach available to her via ACT/ AOT and this and other hospital emergency rooms if pt chooses to avail herself of these options . Pt has largely declined all of the available treatment options still awaiting her should she need them

## 2017-07-27 NOTE — DISCHARGE NOTE BEHAVIORAL HEALTH - SECONDARY DIAGNOSIS.
Hallucinogen use with intoxication Cocaine use disorder, moderate, in controlled environment Nonadherence to medical treatment Intellectual disability

## 2017-07-27 NOTE — DISCHARGE NOTE BEHAVIORAL HEALTH - NSBHDCHOUSING_PSY_A_CORE
pt states she will be living with her boyfriend in [place], [place]/[facility] home/pt states she will be living with her boyfriend in [place], [place] at  [address]. She can be reached there at [phone]

## 2017-07-27 NOTE — DISCHARGE NOTE BEHAVIORAL HEALTH - NSBHDCCRISISPLAN2FT_PSY_A_CORE
Call Dr. Ruiz at [facility] at [phone]  Call the [facility] ACT team at [phone]

## 2017-07-27 NOTE — DISCHARGE NOTE BEHAVIORAL HEALTH - CARE PROVIDER_API CALL
josiah vick [facility]  appointment with ACT team Sagar Vick on 7/31/17 at 12 noon  Follow up appointment to be scheduled with Dr Licea at [facility]  Phone: (   )    -  Fax: (   )    -

## 2017-07-27 NOTE — DISCHARGE NOTE BEHAVIORAL HEALTH - NSBHDCSIGEVENTSFT_PSY_A_CORE
pt with ongoing intermittent poor impulse control in the context of low frustration tolerance.  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

## 2017-07-27 NOTE — DISCHARGE NOTE BEHAVIORAL HEALTH - MEDICATION SUMMARY - MEDICATIONS TO TAKE
I will START or STAY ON the medications listed below when I get home from the hospital:    doxycycline monohydrate 100 mg oral capsule  -- 1 cap(s) by mouth every 12 hours MDD:to complete 7 day course for right ear soft tissue infection  -- Indication: For Right posterior ear soft tissue infection ( 4days left of 7 day course

## 2017-07-27 NOTE — DISCHARGE NOTE BEHAVIORAL HEALTH - NSBHDCDXVALIDYESFT_PSY_A_CORE
Bipolar d/o -I by history  Intellectual Disability  Cocaine use d/o  Hallucinogen use d/o  Parent- child relational problem

## 2017-07-27 NOTE — DISCHARGE NOTE BEHAVIORAL HEALTH - NSBHDCCRISISPLAN1FT_PSY_A_CORE
Talk to a trusted friend or family member and ask for help  Call my ACT team  Call the Suicide Hotline  Call 911 or go to my nearest hospital emergency room

## 2017-07-27 NOTE — DISCHARGE NOTE BEHAVIORAL HEALTH - NSBHDCVIOLSAFETYFT_PSY_A_CORE
1. Safety planning reviewed at length.  2.Pt is fully aware that she  can return  to [facility] ER 24/ 7 for any safety concerns with which pt may present at any time  3.Pt has emergency / hotline telephone numbers for [facility] /AOT and other hotline tel numbers should she require them   4.Pt is aware of this and expressed her understanding of all of the above safety net precautions in place.

## 2017-07-27 NOTE — DISCHARGE NOTE BEHAVIORAL HEALTH - PROVIDER TOKENS
FREE:[LAST:[yudelka],FIRST:[josiah],PHONE:[(   )    -],FAX:[(   )    -],ADDRESS:[[facility]  appointment with ACT team Sagar Vick on 7/31/17 at 12 noon  Follow up appointment to be scheduled with Dr Licea at [facility]]]

## 2017-07-27 NOTE — DISCHARGE NOTE BEHAVIORAL HEALTH - HAS THE PATIENT RECEIVED THE INFLUENZA VACCINE DURING THIS VISIT
Date & Time: 8/19/2021, 11:25 PM  Patient: Priscilla Corral [facility]  Encounter Provider(s):    Marcella Rich MD       To Whom It May Concern:    Luis Snow was seen and treated in our department on 8/19/2021.  She should not return to work until [employer]
No

## 2017-07-27 NOTE — DISCHARGE NOTE BEHAVIORAL HEALTH - NSBHDCADDFT_PSY_A_CORE
NOTE   Pt seen by hospitalist on 7/24 /17 for H and P and again on 7/25/17  for pt c/o right ear pain and swelling around an old ear piercing. Hospitalist  began the pt on a course of Doxycycline 100 mg po q 12 and recommended Plastic Surgery consult  . Writer spoke with plastic surgeon Dr Hughes at [facility] ( tel [phone]. ) He recommended continuing with antibiotic and prn Motrin  and adding alternating cold and warm compresses to pt's right ear q 2 hrs to decrease pain and swelling. If after 2 -3 days, pt still unable to remove ear piercing, to recontact Plastic Surgery who will consult.  · Interval History: Pt seen multiple times throughout the day. Pt c/o right ear pain as above. Pt informed of plan as per Plastics to have pt continue Doxycycline , prn Motrin and cold and warm compresses to rt ear to decrease swelling and pain . Plan reviewed to recontact Plastics in a few days if  still unable to remove piercing from her rt ear..  Pt remains irritable, demanding and with impaired judgment and limited insight into the dangerous lifestyle in which she continues to operate. Pt continues to state, " I am not going to rehab! I'm not ready!" Writer and other staff continue to support and encourage the pt to abstain from all drug use and to reconsider inpatient drug rehab  in order to more effectively deal with the pt's severe abuse of MDMA, cocaine and other hallucinogens.   Pt's SW Ms Keita continuing to try to reach out to pt's outpatient clinic ACT team staff in an effort to have them meet with the pt to further encourage pt to seek  a safer disposition plan.  Pt continues to deny SI / HI /AH /VH. Pt slept reasonably well and is eating all meals provided. Staff continue to encourage the pt to attend therapy groups on the unit despite pt's ongoing resistance.   Ativan taper proceeding in preparation for pt requested DC from hospital. [facility] court retention hearing papers submitted by hospital at pt's request. Though writer and all hospital staff have continued to urge the pt to remain in hospital with more direct pt DC and entry into an inpatient drug rehab program, the pt continues to decline and drug rehab program attendance cannot be legally mandated for this pt at this time.    Pt submitted letter on 7/25/17 requesting hospital DC. Pt met with [facility]  on 7/26/17 and court hearing scheduled for 7/28/17.  Ongoing staff efforts to engage pt in safety planning now and after pt DC from hospital.

## 2017-07-27 NOTE — DISCHARGE NOTE BEHAVIORAL HEALTH - NSBHDCVIOLFCTROTHERFT_PSY_A_CORE
primary future event /stressor most likely related to pt resumption of drug abuse as pt remains ambivalent and not currently motivated for treatment.   Per pt, " I'll do it when I'm ready! I'm not ready yet!"

## 2017-07-27 NOTE — DISCHARGE NOTE BEHAVIORAL HEALTH - NSBHDCADMRISKMITFT_PSY_A_CORE
pt aware of wrap around services and supports available to here 24/7 though pt continues to decline availing herself of these myriad treatment opportunities

## 2017-07-27 NOTE — DISCHARGE NOTE BEHAVIORAL HEALTH - NSBHDCMEDSFT_PSY_A_CORE
MEDICATIONS  (STANDING):  Ativan tapered after 10 mg total received in the ER due to pt combative behavior related to MDMA and cocaine  intoxication  Doxycycline  100 mg po q 12 hrs x 7 days 9 soft tissue infection of right posterior ear ( tragus )   Levaquin 500 mg po q am x 5 days completed ( acute bronchitis)

## 2017-07-27 NOTE — DISCHARGE NOTE BEHAVIORAL HEALTH - NSBHDCCRISISPLAN3FT_PSY_A_CORE
Talk to a trusted friend or family member and ask for help  Go to an AA or NA meeting  Call the ACT team

## 2017-07-31 DIAGNOSIS — F79 UNSPECIFIED INTELLECTUAL DISABILITIES: ICD-10-CM

## 2017-07-31 DIAGNOSIS — F17.210 NICOTINE DEPENDENCE, CIGARETTES, UNCOMPLICATED: ICD-10-CM

## 2017-07-31 DIAGNOSIS — Z91.19 PATIENT'S NONCOMPLIANCE WITH OTHER MEDICAL TREATMENT AND REGIMEN: ICD-10-CM

## 2017-07-31 DIAGNOSIS — Z91.5 PERSONAL HISTORY OF SELF-HARM: ICD-10-CM

## 2017-07-31 DIAGNOSIS — F14.29 COCAINE DEPENDENCE WITH UNSPECIFIED COCAINE-INDUCED DISORDER: ICD-10-CM

## 2017-07-31 DIAGNOSIS — Z90.49 ACQUIRED ABSENCE OF OTHER SPECIFIED PARTS OF DIGESTIVE TRACT: ICD-10-CM

## 2017-07-31 DIAGNOSIS — T43.622A POISONING BY AMPHETAMINES, INTENTIONAL SELF-HARM, INITIAL ENCOUNTER: ICD-10-CM

## 2017-07-31 DIAGNOSIS — J20.9 ACUTE BRONCHITIS, UNSPECIFIED: ICD-10-CM

## 2017-07-31 DIAGNOSIS — F16.229: ICD-10-CM

## 2017-07-31 DIAGNOSIS — Z81.8 FAMILY HISTORY OF OTHER MENTAL AND BEHAVIORAL DISORDERS: ICD-10-CM
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
## 2017-07-31 DIAGNOSIS — L03.211 CELLULITIS OF FACE: ICD-10-CM

## 2017-07-31 DIAGNOSIS — L23.7 ALLERGIC CONTACT DERMATITIS DUE TO PLANTS, EXCEPT FOOD: ICD-10-CM

## 2017-07-31 DIAGNOSIS — Z59.0 HOMELESSNESS: ICD-10-CM

## 2017-07-31 DIAGNOSIS — F84.0 AUTISTIC DISORDER: ICD-10-CM

## 2017-07-31 DIAGNOSIS — F19.939 OTHER PSYCHOACTIVE SUBSTANCE USE, UNSPECIFIED WITH WITHDRAWAL, UNSPECIFIED: ICD-10-CM

## 2017-07-31 DIAGNOSIS — F25.0 SCHIZOAFFECTIVE DISORDER, BIPOLAR TYPE: ICD-10-CM

## 2017-07-31 DIAGNOSIS — Z88.8 ALLERGY STATUS TO OTHER DRUGS, MEDICAMENTS AND BIOLOGICAL SUBSTANCES: ICD-10-CM

## 2017-07-31 DIAGNOSIS — R45.1 RESTLESSNESS AND AGITATION: ICD-10-CM

## 2017-07-31 LAB
CULTURE RESULTS: SIGNIFICANT CHANGE UP
CULTURE RESULTS: SIGNIFICANT CHANGE UP
SPECIMEN SOURCE: SIGNIFICANT CHANGE UP
SPECIMEN SOURCE: SIGNIFICANT CHANGE UP

## 2017-07-31 SDOH — ECONOMIC STABILITY - HOUSING INSECURITY: HOMELESSNESS: Z59.0
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
## 2017-08-01 DIAGNOSIS — S01.341A: ICD-10-CM
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
## 2018-07-26 NOTE — ED PROVIDER NOTE - CARDIAC, MLM
Normal rate, regular rhythm.  Heart sounds S1, S2.  No murmurs, rubs or gallops.
Patient seen and examined with house-staff during bedside rounds.  Resident note read, including vitals, physical findings, laboratory data, and radiological reports.   Revisions included below.  Direct personal management at bed side and extensive interpretation of the data.  Plan was outlined and discussed in details with the housestaff.  Decision making of high complexity  Action taken for acute disease activity to reflect the level of care provided:  - medication reconciliation  - review laboratory data  I reviewed the chest x-ray and discussed the case with orthopedic. Was start IV antibiotic. Follow culture. Is no clinical evidence of endocarditis nor meningitis
Patient seen and examined with house-staff during bedside rounds.  Resident note read, including vitals, physical findings, laboratory data, and radiological reports.   Revisions included below.  Direct personal management at bed side and extensive interpretation of the data.  Plan was outlined and discussed in details with the housestaff.  Decision making of high complexity  Action taken for acute disease activity to reflect the level of care provided:  - medication reconciliation  - review laboratory data  I reviewed the chest x-ray and discussed the case with orthopedic. Was start IV antibiotic. Follow culture. Is no clinical evidence of endocarditis nor meningitis
Patient seen and examined with house-staff during bedside rounds.  Resident note read, including vitals, physical findings, laboratory data, and radiological reports.   Revisions included below.  Direct personal management at bed side and extensive interpretation of the data.  Plan was outlined and discussed in details with the housestaff.  Decision making of high complexity  Action taken for acute disease activity to reflect the level of care provided:  - medication reconciliation  - review laboratory data  Patient clinically stable the pain is incisional. [place] related with physical therapy and doing well.

## 2020-01-01 NOTE — PROGRESS NOTE BEHAVIORAL HEALTH - PROBLEM SELECTOR PROBLEM 1
Suicidal behavior with attempted self-injury
Schizoaffective disorder, bipolar type
Passed

## 2020-07-09 NOTE — PROGRESS NOTE BEHAVIORAL HEALTH - NSBHADMITMEDEDU_PSY_A_CORE
Vital Signs Last 24 Hrs  T(C): 36.9 (09 Jul 2020 17:35), Max: 37.3 (09 Jul 2020 10:40)  T(F): 98.5 (09 Jul 2020 17:35), Max: 99.1 (09 Jul 2020 10:40)  HR: 86 (09 Jul 2020 17:35) (80 - 109)  BP: 102/49 (09 Jul 2020 17:35) (102/49 - 117/54)  BP(mean): 61 (09 Jul 2020 17:35) (61 - 67)  RR: 16 (09 Jul 2020 17:35) (16 - 18)  SpO2: 100% (09 Jul 2020 17:35) (99% - 100%)
yes...
